# Patient Record
Sex: FEMALE | Race: WHITE | Employment: OTHER | URBAN - METROPOLITAN AREA
[De-identification: names, ages, dates, MRNs, and addresses within clinical notes are randomized per-mention and may not be internally consistent; named-entity substitution may affect disease eponyms.]

---

## 2019-06-19 ENCOUNTER — HOSPITAL ENCOUNTER (OUTPATIENT)
Age: 59
Discharge: HOME | End: 2019-06-21

## 2019-06-19 LAB
ABO/RH: NORMAL
ANTIBODY SCREEN: NORMAL

## 2019-06-19 PROCEDURE — 87081 CULTURE SCREEN ONLY: CPT

## 2019-06-19 PROCEDURE — 86900 BLOOD TYPING SEROLOGIC ABO: CPT

## 2019-06-19 PROCEDURE — 87088 URINE BACTERIA CULTURE: CPT

## 2019-06-19 PROCEDURE — 87077 CULTURE AEROBIC IDENTIFY: CPT

## 2019-06-19 PROCEDURE — 87186 SC STD MICRODIL/AGAR DIL: CPT

## 2019-06-19 PROCEDURE — 86850 RBC ANTIBODY SCREEN: CPT

## 2019-06-19 PROCEDURE — 86901 BLOOD TYPING SEROLOGIC RH(D): CPT

## 2019-06-21 LAB
MRSA CULTURE ONLY: NORMAL
ORGANISM: ABNORMAL
URINE CULTURE, ROUTINE: ABNORMAL
URINE CULTURE, ROUTINE: ABNORMAL

## 2019-06-28 ENCOUNTER — HOSPITAL ENCOUNTER (OUTPATIENT)
Age: 59
Discharge: HOME | End: 2019-06-30

## 2019-06-28 LAB
HCT VFR BLD CALC: 39.8 % (ref 34–48)
HEMOGLOBIN: 13.2 G/DL (ref 11.5–15.5)
MCH RBC QN AUTO: 30 PG (ref 26–35)
MCHC RBC AUTO-ENTMCNC: 33.2 % (ref 32–34.5)
MCV RBC AUTO: 90.5 FL (ref 80–99.9)
PDW BLD-RTO: 13.5 FL (ref 11.5–15)
PLATELET # BLD: 369 E9/L (ref 130–450)
PMV BLD AUTO: 9.9 FL (ref 7–12)
RBC # BLD: 4.4 E12/L (ref 3.5–5.5)
WBC # BLD: 8.6 E9/L (ref 4.5–11.5)

## 2019-06-28 PROCEDURE — 85027 COMPLETE CBC AUTOMATED: CPT

## 2019-06-29 ENCOUNTER — HOSPITAL ENCOUNTER (OUTPATIENT)
Age: 59
Discharge: HOME | End: 2019-07-01

## 2019-06-29 LAB
ANION GAP SERPL CALCULATED.3IONS-SCNC: 9 MMOL/L (ref 7–16)
BUN BLDV-MCNC: 9 MG/DL (ref 8–23)
CALCIUM SERPL-MCNC: 8.3 MG/DL (ref 8.6–10.2)
CHLORIDE BLD-SCNC: 107 MMOL/L (ref 98–107)
CO2: 24 MMOL/L (ref 22–29)
CREAT SERPL-MCNC: 0.7 MG/DL (ref 0.5–1)
GFR AFRICAN AMERICAN: >60
GFR NON-AFRICAN AMERICAN: >60 ML/MIN/1.73
GLUCOSE BLD-MCNC: 190 MG/DL (ref 74–99)
HCT VFR BLD CALC: 34.6 % (ref 34–48)
HEMOGLOBIN: 11 G/DL (ref 11.5–15.5)
MCH RBC QN AUTO: 30.3 PG (ref 26–35)
MCHC RBC AUTO-ENTMCNC: 31.8 % (ref 32–34.5)
MCV RBC AUTO: 95.3 FL (ref 80–99.9)
PDW BLD-RTO: 13.9 FL (ref 11.5–15)
PLATELET # BLD: 272 E9/L (ref 130–450)
PMV BLD AUTO: 10.5 FL (ref 7–12)
POTASSIUM SERPL-SCNC: 5.2 MMOL/L (ref 3.5–5)
RBC # BLD: 3.63 E12/L (ref 3.5–5.5)
SODIUM BLD-SCNC: 140 MMOL/L (ref 132–146)
WBC # BLD: 21.8 E9/L (ref 4.5–11.5)

## 2019-06-29 PROCEDURE — 80048 BASIC METABOLIC PNL TOTAL CA: CPT

## 2019-06-29 PROCEDURE — 85027 COMPLETE CBC AUTOMATED: CPT

## 2019-06-30 ENCOUNTER — HOSPITAL ENCOUNTER (OUTPATIENT)
Age: 59
Discharge: HOME | End: 2019-07-02

## 2019-06-30 LAB
ANION GAP SERPL CALCULATED.3IONS-SCNC: 9 MMOL/L (ref 7–16)
BUN BLDV-MCNC: 8 MG/DL (ref 8–23)
CALCIUM SERPL-MCNC: 8.6 MG/DL (ref 8.6–10.2)
CHLORIDE BLD-SCNC: 107 MMOL/L (ref 98–107)
CO2: 24 MMOL/L (ref 22–29)
CREAT SERPL-MCNC: 0.7 MG/DL (ref 0.5–1)
GFR AFRICAN AMERICAN: >60
GFR NON-AFRICAN AMERICAN: >60 ML/MIN/1.73
GLUCOSE BLD-MCNC: 133 MG/DL (ref 74–99)
HCT VFR BLD CALC: 30 % (ref 34–48)
HEMOGLOBIN: 9.4 G/DL (ref 11.5–15.5)
MCH RBC QN AUTO: 29.6 PG (ref 26–35)
MCHC RBC AUTO-ENTMCNC: 31.3 % (ref 32–34.5)
MCV RBC AUTO: 94.3 FL (ref 80–99.9)
PDW BLD-RTO: 14.2 FL (ref 11.5–15)
PLATELET # BLD: 246 E9/L (ref 130–450)
PMV BLD AUTO: 10.6 FL (ref 7–12)
POTASSIUM SERPL-SCNC: 4.5 MMOL/L (ref 3.5–5)
RBC # BLD: 3.18 E12/L (ref 3.5–5.5)
SODIUM BLD-SCNC: 140 MMOL/L (ref 132–146)
WBC # BLD: 28.8 E9/L (ref 4.5–11.5)

## 2019-06-30 PROCEDURE — 85027 COMPLETE CBC AUTOMATED: CPT

## 2019-06-30 PROCEDURE — 80048 BASIC METABOLIC PNL TOTAL CA: CPT

## 2019-07-01 ENCOUNTER — HOSPITAL ENCOUNTER (OUTPATIENT)
Age: 59
Discharge: HOME | End: 2019-07-03

## 2019-07-01 LAB
ANION GAP SERPL CALCULATED.3IONS-SCNC: 9 MMOL/L (ref 7–16)
BUN BLDV-MCNC: 13 MG/DL (ref 8–23)
CALCIUM SERPL-MCNC: 8.7 MG/DL (ref 8.6–10.2)
CHLORIDE BLD-SCNC: 105 MMOL/L (ref 98–107)
CO2: 26 MMOL/L (ref 22–29)
CREAT SERPL-MCNC: 0.7 MG/DL (ref 0.5–1)
GFR AFRICAN AMERICAN: >60
GFR NON-AFRICAN AMERICAN: >60 ML/MIN/1.73
GLUCOSE BLD-MCNC: 85 MG/DL (ref 74–99)
HCT VFR BLD CALC: 27 % (ref 34–48)
HEMOGLOBIN: 8.6 G/DL (ref 11.5–15.5)
MCH RBC QN AUTO: 30.2 PG (ref 26–35)
MCHC RBC AUTO-ENTMCNC: 31.9 % (ref 32–34.5)
MCV RBC AUTO: 94.7 FL (ref 80–99.9)
PDW BLD-RTO: 14.6 FL (ref 11.5–15)
PLATELET # BLD: 241 E9/L (ref 130–450)
PMV BLD AUTO: 10.8 FL (ref 7–12)
POTASSIUM SERPL-SCNC: 4.1 MMOL/L (ref 3.5–5)
RBC # BLD: 2.85 E12/L (ref 3.5–5.5)
SODIUM BLD-SCNC: 140 MMOL/L (ref 132–146)
WBC # BLD: 24.4 E9/L (ref 4.5–11.5)

## 2019-07-01 PROCEDURE — 85027 COMPLETE CBC AUTOMATED: CPT

## 2019-07-01 PROCEDURE — 80048 BASIC METABOLIC PNL TOTAL CA: CPT

## 2020-10-27 ENCOUNTER — HOSPITAL ENCOUNTER (OUTPATIENT)
Age: 60
Discharge: HOME | End: 2020-10-29

## 2020-10-27 PROCEDURE — 88307 TISSUE EXAM BY PATHOLOGIST: CPT

## 2020-10-27 PROCEDURE — 88311 DECALCIFY TISSUE: CPT

## 2020-10-27 PROCEDURE — 88342 IMHCHEM/IMCYTCHM 1ST ANTB: CPT

## 2020-10-27 PROCEDURE — 88341 IMHCHEM/IMCYTCHM EA ADD ANTB: CPT

## 2022-04-22 ENCOUNTER — TELEPHONE (OUTPATIENT)
Dept: SLEEP CENTER | Age: 62
End: 2022-04-22

## 2022-07-23 ENCOUNTER — HOSPITAL ENCOUNTER (OUTPATIENT)
Dept: SLEEP CENTER | Age: 62
Discharge: HOME | End: 2022-07-23
Payer: COMMERCIAL

## 2022-07-23 DIAGNOSIS — G47.33 OSA (OBSTRUCTIVE SLEEP APNEA): Primary | ICD-10-CM

## 2022-07-23 PROCEDURE — 95810 POLYSOM 6/> YRS 4/> PARAM: CPT

## 2022-07-28 ENCOUNTER — OFFICE VISIT (OUTPATIENT)
Dept: SLEEP CENTER | Age: 62
End: 2022-07-28
Payer: COMMERCIAL

## 2022-07-28 VITALS
DIASTOLIC BLOOD PRESSURE: 82 MMHG | HEIGHT: 62 IN | HEART RATE: 79 BPM | RESPIRATION RATE: 16 BRPM | BODY MASS INDEX: 35.77 KG/M2 | WEIGHT: 194.4 LBS | SYSTOLIC BLOOD PRESSURE: 125 MMHG | OXYGEN SATURATION: 97 %

## 2022-07-28 DIAGNOSIS — E66.9 OBESITY, UNSPECIFIED CLASSIFICATION, UNSPECIFIED OBESITY TYPE, UNSPECIFIED WHETHER SERIOUS COMORBIDITY PRESENT: Primary | ICD-10-CM

## 2022-07-28 DIAGNOSIS — G47.33 OSA (OBSTRUCTIVE SLEEP APNEA): ICD-10-CM

## 2022-07-28 PROCEDURE — 99203 OFFICE O/P NEW LOW 30 MIN: CPT | Performed by: STUDENT IN AN ORGANIZED HEALTH CARE EDUCATION/TRAINING PROGRAM

## 2022-07-28 RX ORDER — LEVOTHYROXINE SODIUM 0.07 MG/1
75 TABLET ORAL DAILY
COMMUNITY

## 2022-07-28 RX ORDER — LOSARTAN POTASSIUM 25 MG/1
25 TABLET ORAL DAILY
COMMUNITY

## 2022-07-28 RX ORDER — OMEPRAZOLE 20 MG/1
20 CAPSULE, DELAYED RELEASE ORAL DAILY
COMMUNITY

## 2022-07-28 RX ORDER — MECLIZINE HYDROCHLORIDE 25 MG/1
25 TABLET ORAL 3 TIMES DAILY PRN
COMMUNITY

## 2022-07-28 ASSESSMENT — SLEEP AND FATIGUE QUESTIONNAIRES
HOW LIKELY ARE YOU TO NOD OFF OR FALL ASLEEP WHILE WATCHING TV: 2
HOW LIKELY ARE YOU TO NOD OFF OR FALL ASLEEP WHILE LYING DOWN TO REST IN THE AFTERNOON WHEN CIRCUMSTANCES PERMIT: 3
HOW LIKELY ARE YOU TO NOD OFF OR FALL ASLEEP WHILE SITTING AND TALKING TO SOMEONE: 0
HOW LIKELY ARE YOU TO NOD OFF OR FALL ASLEEP IN A CAR, WHILE STOPPED FOR A FEW MINUTES IN TRAFFIC: 0
HOW LIKELY ARE YOU TO NOD OFF OR FALL ASLEEP WHILE SITTING QUIETLY AFTER LUNCH WITHOUT ALCOHOL: 0
HOW LIKELY ARE YOU TO NOD OFF OR FALL ASLEEP WHEN YOU ARE A PASSENGER IN A CAR FOR AN HOUR WITHOUT A BREAK: 0
HOW LIKELY ARE YOU TO NOD OFF OR FALL ASLEEP WHILE SITTING AND READING: 0
ESS TOTAL SCORE: 5
HOW LIKELY ARE YOU TO NOD OFF OR FALL ASLEEP WHILE SITTING INACTIVE IN A PUBLIC PLACE: 0

## 2022-07-28 NOTE — PROGRESS NOTES
[facility] Sleep Medicine    Patient Name: Jeanne Mercado  Age: 60 y.o.   : 1960    Date of Visit: 8/3/22      HPI   Jeanne Mercado is a 60 y.o. female with  has a past medical history of Other specified hypothyroidism, Secondary hypertension, and Vertigo. who presents as a new patient to Sleep Clinic, referred by Dr. Ean Dinh, for Sleep Apnea     Patient presents to today to establish with sleep medicine after here recent sleep study. Her sleep study demonstrated mild SIMBA, however there were increased periods during REM and supine sleep. Patient was referred for a sleep study due to symptoms of snoring and difficulty staying asleep at night. Sleep Study Results  Diagnostic Sleep Study:     Study date: 2022     AHI: 5.9      Oxygen Toni: 84.0%    Bed time:   11p-2a  Wake time:   5-6 a  Sleep Latency (min): 15-20     Sleep Medications: None  Awakenings:   2-3   / bathroom and spontaneous / falls back asleep quickly  Estimated Sleep time (hours):  4-5  Daytime Naps: small naps while watching tv  Sleep disturbances: Back pain  Sleep Location: Bed  Sleep environment: Sleeps alone  Occupation: retired    SLEEP HYGIENE     Activities before bed: TV  Caffeine:  0   Coffee    occasional soda   Soda    0   Tea  Alcohol: occasionally  Tobacco: none        Sleep ROS:     Snoring: Y   Witnessed apneas: N  AM Headache: N  Dry Mouth: N  Daytime Sleepiness: Y  Difficulty remembering things: N  MVA  or near miss accident due to sleepiness in the past? N  Tonsillectomy? N  Have you lost or gained weight recently?  Gained- 5 lbs       PARASOMNIAS/ NARCOLEPSY:  Hypnogogic/Hynopompic Hallucinations: N   Sleep paralysis: N  Cataplexy: N   REM behavior symptoms: N  Nightmare: N   Sleep Walking: N  Sleep Talking: N  RLS Symptoms: occasionally restless feeling, improved with motion,  dosnt interfer with sleep           PMH:  Past Medical History:   Diagnosis Date    Other specified hypothyroidism     Secondary hypertension Vertigo         PSH:  Past Surgical History:   Procedure Laterality Date    ANKLE SURGERY      BACK SURGERY      CHOLECYSTECTOMY            Soc Hx:  Social History     Tobacco Use    Smoking status: Never    Smokeless tobacco: Never        Fam Hx:  Family History   Problem Relation Age of Onset    Heart Disease Sister         Current Outpatient Medications   Medication Sig Dispense Refill    levothyroxine (SYNTHROID) 75 MCG tablet Take 75 mcg by mouth in the morning. losartan (COZAAR) 25 MG tablet Take 25 mg by mouth in the morning. omeprazole (PRILOSEC) 20 MG delayed release capsule Take 20 mg by mouth in the morning. vitamin D 25 MCG (1000 UT) CAPS Take 1 capsule by mouth in the morning. meclizine (ANTIVERT) 25 MG tablet Take 25 mg by mouth 3 times daily as needed for Dizziness       No current facility-administered medications for this visit. Review of Systems  Constitutional: no chills, no fever and no night sweats. Eyes: no blurred vision and no eyesight problems. ENT: no hearing loss, no nasal congestion, no nasal discharge, no hoarseness and no sore throat. Cardiovascular: no chest pain, no lower extremity edema. Respiratory: no cough, no shortness of breath at rest and no wheezing. Gastrointestinal: no abdominal pain, no nausea and no vomiting. Musculoskeletal: no arthralgias, no back pain and no myalgias. Integumentary: no rashes. Neurological: no difficulty walking, no diplopia, no dizziness, no dysarthria, no facial weakness, no headache, no limb weakness, no memory changes, no numbness, no speech difficulties and no tingling. Endocrine: no changes in appetite, no feelings of weakness and no recent abnormal weight gain/loss.    Hematologic/Lymphatic: no tendency for easy bruising or bleeding      Objective:   Physical Exam  /82 (Site: Left Upper Arm, Position: Sitting, Cuff Size: Large Adult)   Pulse 79   Resp 16   Ht 5' 2\" (1.575 m)   Wt 194 lb 6.4 oz (88.2 kg)   SpO2 97%   BMI 35.56 kg/m²             General: No acute distress. BMI Body mass index is 35.56 kg/m². HEENT: Normocephalic, atraumatic. No oropharyngeal lesions. There were no vitals filed for this visit. Mallampati class- 3  Tonsils- 1     Neck: Trachea midline  Lungs: Clear to auscultation bilaterally. No wheezes or crackles  Cardiac: Regular rate and rhythm. No murmurs appreciated. Neurologic: Normal gait. Balance intact. Psychiatric: Alert and oriented. Appropriate affect. Extremities: No clubbing or no peripheral edema  Skin: No lesions or rashes  Hematologic/lymphatic/immunologic: No bruises or bleeding    Sleep Medicine 7/28/2022   Sitting and reading 0   Watching TV 2   Sitting, inactive in a public place (e.g. a theatre or a meeting) 0   As a passenger in a car for an hour without a break 0   Lying down to rest in the afternoon when circumstances permit 3   Sitting and talking to someone 0   Sitting quietly after a lunch without alcohol 0   In a car, while stopped for a few minutes in traffic 0   Total score 5       PROCEDURE HISTORY      Diagnostic Sleep Study:     Study date: 7/23/2022     AHI: 5.9      Oxygen Toni: 84.0%    PERTINENT LAB RESULTS  No results found for: FERRITIN       Assessment:      Abena Taylor was seen today for sleep apnea. Diagnoses and all orders for this visit:    Obesity, unspecified classification, unspecified obesity type, unspecified whether serious comorbidity present  -     Ambulatory Referral to Bariatric Surgery    SIMBA (obstructive sleep apnea)     Plan:      SIMBA. Sleep study results reviewed with the patient. Treatment options were discussed with the patient, including weight loss, CPAP titration, APAP trial, oral appliance evaluation, and possible surgical options. After discussion, patient elected to proceed with weight loss and follow up in 6 months due to mild severity.  Patient was advised about the risks of untreated SIMBA and these were included in the patient's handout. 2.  Obesity. Body mass index is 35.56 kg/m². - Referral to medical weight loss.  -Healthy weight loss advised      Patient will follow up Return in about 6 months (around 1/28/2023) for Follow up SIMBA.     Saleem Gallego, [address] [place] [address] Rd -[phone] option 2  F- [phone]

## 2022-07-28 NOTE — PATIENT INSTRUCTIONS
Notice of Provider Departure    Dr. Lilo Zuleta will be leaving [facility] ([facility]) in September. Our department has 3 providers that you can follow up with for your sleep care after September 16th:       [facility] CNP  [address] [facility] TASNEEM Eng MD  [address] [place] [address] free to call our office with any questions    Ashelyloretoemilymylaminnie [phone] option 2  f- [phone]      Please try avoiding sleeping on your back (there are special pillows for this, supine avoidance pillows) and elevated the head of your bed. Please note that complications of SIMBA if not treated can increase risk for: systemic hypertension , pulmonary hypertension (high blood pressure in the lungs), cardiovascular morbidities (heart attack and stroke), car accidents and all cause mortality (increased risk of death). It is recommended that you be treated for sleep apnea. Please discuss your sleep study results with you primary care provider, Jami Formna MD, and let me know if you would like to proceed with treatment for sleep apnea. The general treatment options for sleep apnea are listed below. The general categories for the treatment of Sleep Apnea (including benefits and potential side effects) include:     1. Supportive Care  -Elevate the head of the bed   -Avoid sleeping on your back      2. Weight loss  -Start a healthy weight loss program  -Consider a referral to Weight Loss Medicine Clinic  -Benefits: Multiple health benefits  -Side Effects: Depends on treatment type     3. Oral Appliance \"Mouth Piece\"  (Mandibular Advancement Device)  -Benefits: CPAP alternative. Decreased snoring, improved sleep quality, decreased sleepiness.  -Side Effects: teeth crowding, oral/dental pain. Regular visits with dentist are advised to watch for side effects.      4. Positive pressure therapy with a machine and a mask (CPAP or BiPAP)  Benefits: Decreased snoring, improved sleep quality, decreased sleepiness. Risks: Aerophagia (swallowing air), dry nose/nose bleeds, dry eyes (due to leak), skin sores/rash (due to mask)     5. Different kinds of surgeries, including nasal surgery, surgery of the throat/windpipe, and nerve stimulation therapy (INSPIRE). Benefits: CPAP alternative. Decreased snoring, improved sleep quality, decreased sleepiness. Risks: Bleeding, infection, nerve damage          For general questions or scheduling issues, call the sleep nurses at [phone] option [id number] [place], [place] [phone] option 2  f- [phone]           Learning About Sleep Apnea  What is it? Sleep apnea means that breathing stops for short periods during sleep. When you stop breathing or have reduced airflow into your lungs during sleep, you don't sleep well and you can be very tired during the day. The oxygen levels in your blood may go down, and carbon dioxide levels go up. It may lead to other problems, such as high blood pressure and heart disease. Sleep apnea can range from mild to severe, based on how often breathing stops during sleep. Breathing may stop as few as 5 times an hour (mild apnea) to 30 or more times an hour (severe apnea). Obstructive sleep apnea is the most common type. This most often occurs because your airways are blocked or partly blocked. Central sleep apnea is less common. It happens when the brain has trouble controlling breathing. Some people have both types. That's called complex sleep apnea. What are the symptoms? There are symptoms of sleep apnea that you may notice and symptoms that others may notice when you're asleep. Symptoms you may notice include:  Feeling extremely sleepy during the day. Feeling unrefreshed or tired after a night's sleep. Problems with memory and concentration, or mood changes. Morning or night headaches. Heartburn or a sour taste in your mouth at night.   Swelling of the legs. Getting up often during the night to urinate. A dry mouth or sore throat in the morning. Your bed partner may notice that you:  Have episodes of not breathing. Snore loudly. Almost all people who have sleep apnea snore. But not all people who snore have sleep apnea. Toss and turn during sleep. Have nighttime choking or gasping spells. How is it diagnosed? Your doctor will probably do a physical exam and ask about your past health. He or she may also ask you or your bed partner about your snoring and sleep behavior and how tired you feel during the day. Your doctor may suggest a sleep study. Sleep studies are a series of tests that look at what happens to the body during sleep. They check for how often you stop breathing or have too little air flowing into your lungs during sleep. They also find out how much oxygen you have in your blood during sleep. A sleep study may take place in your home. Or it might take place at a sleep center, where you will spend the night. How is it treated? Sleep apnea is often treated with devices that deliver air through a mask to help keep your airways open. These include:  Continuous positive airway pressure (CPAP). This increases air pressure in your throat. It keeps your airway open when you breathe in. It's the most common device. Bilevel positive airway pressure (BiPAP). This uses different air pressures when you breathe in and out. Adaptive servo ventilation (ASV). It senses pauses in breathing and adjusts air pressure. It's mostly used for central sleep apnea. If your tonsils or other tissues are blocking your airway, your doctor may suggest surgery to open the airway. How can you care for yourself? You may be able to treat mild sleep apnea by making changes in how you live and the way you sleep. For example, it may help to: Lose weight in a healthy manner if you are overweight. Sleep on your side, not your back.   Avoid alcohol and medicines such as sedatives before bed. Where can you learn more? Go to https://[email or web address].[email or web address]. org and sign in to your [employer] account. Enter S121 in the ivWatch box to learn more about \"Learning About Sleep Apnea. \"     If you do not have an account, please click on the \"Sign Up Now\" link. Current as of: February 24, 2020               Content Version: 12.6  © 2006-2020 [employer]. Care instructions adapted under license by [facility] ([facility]). If you have questions about a medical condition or this instruction, always ask your healthcare professional. [address] any warranty or liability for your use of this information.

## 2022-07-29 ENCOUNTER — TELEPHONE (OUTPATIENT)
Dept: BARIATRICS/WEIGHT MGMT | Age: 62
End: 2022-07-29

## 2022-08-03 PROBLEM — G47.33 OSA (OBSTRUCTIVE SLEEP APNEA): Status: ACTIVE | Noted: 2022-08-03

## 2022-08-03 LAB — STATUS: NORMAL

## 2022-08-03 PROCEDURE — 95810 POLYSOM 6/> YRS 4/> PARAM: CPT | Performed by: STUDENT IN AN ORGANIZED HEALTH CARE EDUCATION/TRAINING PROGRAM

## 2023-01-19 ENCOUNTER — OFFICE VISIT (OUTPATIENT)
Dept: SLEEP CENTER | Age: 63
End: 2023-01-19
Payer: COMMERCIAL

## 2023-01-19 VITALS
RESPIRATION RATE: 16 BRPM | HEART RATE: 96 BPM | WEIGHT: 193.6 LBS | BODY MASS INDEX: 35.63 KG/M2 | OXYGEN SATURATION: 95 % | HEIGHT: 62 IN | SYSTOLIC BLOOD PRESSURE: 127 MMHG | DIASTOLIC BLOOD PRESSURE: 85 MMHG

## 2023-01-19 DIAGNOSIS — G47.33 OBSTRUCTIVE SLEEP APNEA: Primary | ICD-10-CM

## 2023-01-19 DIAGNOSIS — E66.9 OBESITY, UNSPECIFIED CLASSIFICATION, UNSPECIFIED OBESITY TYPE, UNSPECIFIED WHETHER SERIOUS COMORBIDITY PRESENT: ICD-10-CM

## 2023-01-19 PROCEDURE — 99213 OFFICE O/P EST LOW 20 MIN: CPT | Performed by: NURSE PRACTITIONER

## 2023-01-19 RX ORDER — LIOTHYRONINE SODIUM 5 UG/1
5 TABLET ORAL DAILY
COMMUNITY

## 2023-01-19 ASSESSMENT — SLEEP AND FATIGUE QUESTIONNAIRES
HOW LIKELY ARE YOU TO NOD OFF OR FALL ASLEEP WHILE SITTING AND READING: 2
HOW LIKELY ARE YOU TO NOD OFF OR FALL ASLEEP WHILE SITTING QUIETLY AFTER LUNCH WITHOUT ALCOHOL: 1
ESS TOTAL SCORE: 5
HOW LIKELY ARE YOU TO NOD OFF OR FALL ASLEEP WHILE LYING DOWN TO REST IN THE AFTERNOON WHEN CIRCUMSTANCES PERMIT: 0
HOW LIKELY ARE YOU TO NOD OFF OR FALL ASLEEP WHILE WATCHING TV: 2
HOW LIKELY ARE YOU TO NOD OFF OR FALL ASLEEP WHEN YOU ARE A PASSENGER IN A CAR FOR AN HOUR WITHOUT A BREAK: 0
HOW LIKELY ARE YOU TO NOD OFF OR FALL ASLEEP IN A CAR, WHILE STOPPED FOR A FEW MINUTES IN TRAFFIC: 0
HOW LIKELY ARE YOU TO NOD OFF OR FALL ASLEEP WHILE SITTING AND TALKING TO SOMEONE: 0
HOW LIKELY ARE YOU TO NOD OFF OR FALL ASLEEP WHILE SITTING INACTIVE IN A PUBLIC PLACE: 0

## 2023-01-19 NOTE — PROGRESS NOTES
[facility] Sleep Medicine    Patient Name: Linette Edgar  Age: 58 y.o.   : 1960    Date of Visit: 23        Review of Last Visit Summary:    The patient was last seen on 2022 by Dr. Tristan Quintanilla for  Obstructive Sleep Apnea. Interim History:     Linette Edgar is a 58 y.o. female that  has a past medical history of Other specified hypothyroidism, Secondary hypertension, and Vertigo. She presents in follow up to Sleep Clinic for mild SIMBA. Interval Events:    -No significant changes in sleep symptoms or disturbances since last visit with Dr. Cary Valles. -Mild snoring is present at night.  -Waking up feeling rested, good energy throughout the day. -Naturally goes to bed late and wakes up at times through the night. Sleeping on average 5 hours but has been on this pattern for as long as she can remember.   -Upcoming appointment with Dr. Aden Chang to aid in weight loss journey.  -No further complaints. Sleep Study History: Diagnostic Sleep Study:     2022- @ [facility]- wt 186 lbs, sleep efficiency 72%, REM sleep 19%,     AHI: 5.9 , supine AHI 29, REM AHI 22,     oxygen Toni: 84.0%, T<88% was 3.9 minutes of total sleep time    Sleep History:     Bed time:   11p-2a  Wake time:   5-6 a  Sleep Latency (min): 15-20     Sleep Medications: None  Awakenings:   2-3   / bathroom and spontaneous / falls back asleep quickly  Estimated Sleep time (hours):  4-5  Daytime Naps: small naps while watching tv  Sleep disturbances: Back pain  Sleep Location: Bed  Sleep environment: Sleeps alone  Occupation: retired     SLEEP HYGIENE     Activities before bed: TV  Caffeine:  0   Coffee    occasional soda   Soda    0   Tea  Alcohol: occasionally  Tobacco: none        Sleep ROS:     Snoring: Y   Witnessed apneas: N  AM Headache: N  Dry Mouth: N  Daytime Sleepiness: Y  Difficulty remembering things: N  MVA  or near miss accident due to sleepiness in the past? N  Tonsillectomy?  N  Have you lost or gained weight recently? Gained- 5 lbs        PARASOMNIAS/ NARCOLEPSY:  Hypnogogic/Hynopompic Hallucinations: N   Sleep paralysis: N  Cataplexy: N   REM behavior symptoms: N  Nightmare: N   Sleep Walking: N  Sleep Talking: N  RLS Symptoms: occasionally restless feeling, improved with motion,  dosnt interfer with sleep  Past Medical History:  Past Medical History:   Diagnosis Date    Other specified hypothyroidism     Secondary hypertension     Vertigo        Past Surgical History:        Procedure Laterality Date    ANKLE SURGERY      BACK SURGERY      CHOLECYSTECTOMY         Allergies:  is allergic to naproxen.   Social History:    Social History     Tobacco Use    Smoking status: Never    Smokeless tobacco: Never        Family History:       Problem Relation Age of Onset    Heart Disease Sister        Current Medications:    Current Outpatient Medications:     liothyronine (CYTOMEL) 5 MCG tablet, Take 5 mcg by mouth daily, Disp: , Rfl:     levothyroxine (SYNTHROID) 75 MCG tablet, Take 75 mcg by mouth in the morning., Disp: , Rfl:     losartan (COZAAR) 25 MG tablet, Take 25 mg by mouth in the morning., Disp: , Rfl:     omeprazole (PRILOSEC) 20 MG delayed release capsule, Take 20 mg by mouth in the morning., Disp: , Rfl:     vitamin D 25 MCG (1000 UT) CAPS, Take 1 capsule by mouth in the morning., Disp: , Rfl:     meclizine (ANTIVERT) 25 MG tablet, Take 25 mg by mouth 3 times daily as needed for Dizziness, Disp: , Rfl:     Sleep Medicine 1/19/2023 7/28/2022   Sitting and reading 2 0   Watching TV 2 2   Sitting, inactive in a public place (e.g. a theatre or a meeting) 0 0   As a passenger in a car for an hour without a break 0 0   Lying down to rest in the afternoon when circumstances permit 0 3   Sitting and talking to someone 0 0   Sitting quietly after a lunch without alcohol 1 0   In a car, while stopped for a few minutes in traffic 0 0   [place] Sleepiness Score 5 5     Review of Systems:    Constitutional: no chills, no fever Eyes: no blurred vision   Cardiovascular: no chest pain,   Respiratory: no cough, no shortness of breath   Gastrointestinal:  no nausea,  no vomiting, no diarrhea. Musculoskeletal: no arthralgias, no back pain   Neurological:  no dizziness,  no headache, no memory changes. Endocrine: No chills    Objective:   PHYSICAL EXAM:    /85 (Site: Left Upper Arm, Position: Sitting, Cuff Size: Large Adult)   Pulse 96   Resp 16   Ht 5' 2\" (1.575 m)   Wt 193 lb 9.6 oz (87.8 kg)   SpO2 95%   BMI 35.41 kg/m²     Physical exam:  Gen: No acute distress. BMI of Body mass index is 35.41 kg/m². Neck: Trachea midline. No obvious mass. Neck circumference     Resp: No accessory muscle use. No crackles. No wheezes. No rhonchi. CV: Regular rate. Regular rhythm. No murmur or rub. Skin: Warm and dry. M/S: No cyanosis. No obvious joint deformity. Neuro: Awake. Alert. Moves all four extremities. Psych: Alert and oriented. No anxiety. Assessment:      Harshil Christianson was seen today for sleep problem. Diagnoses and all orders for this visit:    Obstructive sleep apnea    Obesity, unspecified classification, unspecified obesity type, unspecified whether serious comorbidity present     Plan:      Mild Obstructive Sleep Apnea    -Reviewed sleep study with patient. Mild SIMBA based on symptoms and co morbid conditions. Discussed treatment options, at this time patient not interested in PAP therapy.   -Discussed avoidance of supine sleep, as SIMBA worsened to a moderate degree when supine in sleep study.  -Reviewed CPAP options, not interested at this time.  -Encouragement given to pursue mild weight loss. -If symptoms worsen in severity, please contact office. 2. Obesity. Body mass index is 35.41 kg/m². -Upcoming apt with Dr. Bill Chacko.  -Discussed impact of weight gain on SIMBA severity. Patient understands that SIMBA severity may improve with weight loss but no guarantee of cure can be made.       Return if symptoms worsen or fail to improve.     Deyvi Aguilera, APRN-CNP  [address] [facility]  P -[phone] option 2  H- [phone]

## 2023-03-30 ENCOUNTER — OFFICE VISIT (OUTPATIENT)
Dept: BARIATRICS/WEIGHT MGMT | Age: 63
End: 2023-03-30
Payer: COMMERCIAL

## 2023-03-30 VITALS
HEIGHT: 61 IN | TEMPERATURE: 97.9 F | BODY MASS INDEX: 36.17 KG/M2 | WEIGHT: 191.6 LBS | SYSTOLIC BLOOD PRESSURE: 144 MMHG | DIASTOLIC BLOOD PRESSURE: 81 MMHG | HEART RATE: 77 BPM

## 2023-03-30 DIAGNOSIS — G47.33 OSA (OBSTRUCTIVE SLEEP APNEA): Primary | ICD-10-CM

## 2023-03-30 DIAGNOSIS — E66.09 CLASS 2 OBESITY DUE TO EXCESS CALORIES WITHOUT SERIOUS COMORBIDITY WITH BODY MASS INDEX (BMI) OF 36.0 TO 36.9 IN ADULT: ICD-10-CM

## 2023-03-30 PROBLEM — E66.9 OBESITY: Status: ACTIVE | Noted: 2023-03-30

## 2023-03-30 PROCEDURE — 99205 OFFICE O/P NEW HI 60 MIN: CPT | Performed by: INTERNAL MEDICINE

## 2023-03-30 RX ORDER — LEVOTHYROXINE SODIUM 0.05 MG/1
50 TABLET ORAL DAILY
COMMUNITY

## 2023-03-30 NOTE — PROGRESS NOTES
the morning. vitamin D 25 MCG (1000 UT) CAPS Take 1 capsule by mouth in the morning. meclizine (ANTIVERT) 25 MG tablet Take 25 mg by mouth 3 times daily as needed for Dizziness       No current facility-administered medications for this visit. ROS -  Card - no CP  GI - no N/V/D/C    PE -  Gen : BP (!) 144/81 (Site: Left Upper Arm, Position: Sitting, Cuff Size: Large Adult)   Pulse 77   Temp 97.9 °F (36.6 °C) (Temporal)   Ht 5' 0.5\" (1.537 m)   Wt 191 lb 9.6 oz (86.9 kg)   BMI 36.80 kg/m²    WN, WD, NAD  Heart:  RRR w/o MGR, no Carotid bruits  Lung: Nml resp effort, CTA b/l  Psych: Normal mood   Full affect  Neuro: Moves all ext well  ______________________    HISTORY & ASSESSMENT/PLAN -     Prob 1 - SIMBA   HPI -  7/2023 Sleep study AHI 5.9   Opted to not start PAP therapy   Follows with Ms. Cox DUC Johnson   Daytime drowsiness this past week: 6/10  Assessment - Uncontrolled; excess wt is increasing the degree of the underlying airway obstruction and decreasing oxygenation   Plan -   Wt reduction per the plan below    Prob 2 - Obesity   HPI - See above Background for description           Weight  Date           191.6 lbs 03/30/23  DEN = 1575 lizzette/d  Wt effect of HR foods = [employer] 300 lizzette/wk + Sweets 2050 +  = 3268 lizzette/wk = 465 lizzette/d = 30% DEN = 46 lbs/yr  Counting calories may not be necessary. The calorie space occupied by her HR foods is high enough that reduction of it by elimination/restriction of food type may alone be sufficient to produce an adequate rate of wt loss. Will, however, provide a daily calorie target to provide a general guide for making calorie conscious choices. Will hold on an appetite suppressant at this time since she believes that her appetite will not be much of a problem. Will reconsider at the next visit.   Assessment - Uncontrolled   Plan -   Rules:  Limit sweets to one day per month  Eliminate all sugar sweetened beverages (including fruit

## 2023-03-30 NOTE — PATIENT INSTRUCTIONS
Rules:  Limit sweets to one day per month  Eliminate all sugar sweetened beverages (including fruit juice)    Requirements:  Make sure protein intake is at least 75 grams per day (do not count protein every day; instead spot check your intake every 2-3 weeks and make sure what you think you are getting is close to accurate; consider using a protein shake if needed; these are in the pharmacy section of the stores, not the grocery section; Premier, Pure Protein and Fairlife are relatively inexpensive and taste good to most patients; other options are Nectar, Boost Max, Ensure Max, BeneProtein and GNC lean (which is lactose-free); Nectar fruit, Premier Protein Clear, IsoPure Protein Drink, and Protein 2 O are water-based options; Quest (or Cosco, which is cheaper and is ordered on [address] [facility] [address]) and the Revl 1 protein bars can also be used, but have less protein in them )  (Disclaimer: Dietary supplements rarely have their listed ingredients and the amount of each verified by a third party other. Sometimes they give verification for their claims to be GMO and gluten free and to be organic. However, even such verifications as these may still be untrustworthy.)  Make sure that fiber intake is at least 22 grams per day. Do this by either eating 12 tablespoons of the original, plain Fiber One cereal every day or 4 tablespoons of wheat dextrin powder (Benefiber or a generic brand) every day. Work up to this amount slowly by starting with only one-eighth to one-fourth of the target amount and then adding another one-eighth to one-fourth every one or two weeks until reaching the target. Also, Nature Made fiber gummies with inulin is an option. Drink at least 64 oz of water each day  Take one multivitamin every day    Targets:  Limit calorie intake to 1200 calories/day  Walk 30 minutes daily  Avoid eating 2 hours within bedtime.      Tips:  Do not eat outside of the dining room or the kitchen  Do not eat while watching TV,

## 2023-05-11 ENCOUNTER — OFFICE VISIT (OUTPATIENT)
Dept: BARIATRICS/WEIGHT MGMT | Age: 63
End: 2023-05-11
Payer: COMMERCIAL

## 2023-05-11 VITALS
SYSTOLIC BLOOD PRESSURE: 143 MMHG | HEIGHT: 61 IN | WEIGHT: 190 LBS | DIASTOLIC BLOOD PRESSURE: 75 MMHG | TEMPERATURE: 97.3 F | BODY MASS INDEX: 35.87 KG/M2 | HEART RATE: 56 BPM

## 2023-05-11 DIAGNOSIS — G47.33 OSA (OBSTRUCTIVE SLEEP APNEA): Primary | ICD-10-CM

## 2023-05-11 DIAGNOSIS — E66.09 CLASS 2 OBESITY DUE TO EXCESS CALORIES WITHOUT SERIOUS COMORBIDITY WITH BODY MASS INDEX (BMI) OF 36.0 TO 36.9 IN ADULT: ICD-10-CM

## 2023-05-11 PROCEDURE — 99211 OFF/OP EST MAY X REQ PHY/QHP: CPT

## 2023-05-11 PROCEDURE — 99213 OFFICE O/P EST LOW 20 MIN: CPT | Performed by: INTERNAL MEDICINE

## 2023-05-11 NOTE — PROGRESS NOTES
next visit. Update:  Calorie conscious:  Not counting (not part of her plan); + calorie conscious  Sweets: none  SSB: none  Exercising: walking 20-30 min 3d/wk   Protein: has not checked her intake; 1 Ensure High Protein  Fiber: Benefiber 1 Tbsp daily  Water: >48 oz/day  Will be going to [facility] and [facility] for a three weeks; leaves June 5th; states she will not be drinking much Etoh  Assessment - Improved; cont with present plan per her preference; she does not want to add calorie counting or start an appetite suppressant; needs to increase water intake  Plan -   Rules:  Limit sweets to one day per month  Eliminate all sugar sweetened beverages (including fruit juice)    Requirements:  Make sure protein intake is at least 75 grams per day (do not count protein every day; instead spot check your intake every 2-3 weeks and make sure what you think you are getting is close to accurate; consider using a protein shake if needed; these are in the pharmacy section of the stores, not the grocery section; Premier, Pure Protein and Fairlife are relatively inexpensive and taste good to most patients; other options are Nectar, Boost Max, Ensure Max, BeneProtein and GNC lean (which is lactose-free); Nectar fruit, Premier Protein Clear, IsoPure Protein Drink, and Protein 2 O are water-based options; Quest (or Cosco, which is cheaper and is ordered on [address]) and the Oh Yeah 1 protein bars can also be used, but have less protein in them )  (Disclaimer: Dietary supplements rarely have their listed ingredients and the amount of each verified by a third party other. Sometimes they give verification for their claims to be GMO and gluten free and to be organic. However, even such verifications as these may still be untrustworthy.)  Make sure that fiber intake is at least 22 grams per day.  Do this by either eating 12 tablespoons of the original, plain Fiber One cereal every day or 4 tablespoons of wheat dextrin powder (Benefiber or

## 2023-05-11 NOTE — PATIENT INSTRUCTIONS
Rules:  Limit sweets to one day per month  Eliminate all sugar sweetened beverages (including fruit juice)    Requirements:  Make sure protein intake is at least 75 grams per day (do not count protein every day; instead spot check your intake every 2-3 weeks and make sure what you think you are getting is close to accurate; consider using a protein shake if needed; these are in the pharmacy section of the stores, not the grocery section; Premier, Pure Protein and Fairlife are relatively inexpensive and taste good to most patients; other options are Nectar, Boost Max, Ensure Max, BeneProtein and GNC lean (which is lactose-free); Nectar fruit, Premier Protein Clear, IsoPure Protein Drink, and Protein 2 O are water-based options; Quest (or Cosco, which is cheaper and is ordered on [address]) and the Numerex 1 protein bars can also be used, but have less protein in them )  (Disclaimer: Dietary supplements rarely have their listed ingredients and the amount of each verified by a third party other. Sometimes they give verification for their claims to be GMO and gluten free and to be organic. However, even such verifications as these may still be untrustworthy.)  Make sure that fiber intake is at least 22 grams per day. Do this by either eating 12 tablespoons of the original, plain Fiber One cereal every day or 4 tablespoons of wheat dextrin powder (Benefiber or a generic brand) every day. Work up to this amount slowly by starting with only one-eighth to one-fourth of the target amount and then adding another one-eighth to one-fourth every one or two weeks until reaching the target. Also, Nature Made fiber gummies with inulin is an option. Drink at least 64 oz of water each day  Take one multivitamin every day    Targets:  Limit calorie intake to 1200 calories/day  Walk 30 minutes daily  Avoid eating 2 hours within bedtime.      Tips:  Do not eat outside of the dining room or the kitchen  Do not eat while watching TV,

## 2023-07-11 ENCOUNTER — OFFICE VISIT (OUTPATIENT)
Dept: BARIATRICS/WEIGHT MGMT | Age: 63
End: 2023-07-11
Payer: COMMERCIAL

## 2023-07-11 VITALS
TEMPERATURE: 96.9 F | HEIGHT: 61 IN | SYSTOLIC BLOOD PRESSURE: 148 MMHG | WEIGHT: 185.6 LBS | BODY MASS INDEX: 35.04 KG/M2 | DIASTOLIC BLOOD PRESSURE: 73 MMHG | HEART RATE: 76 BPM

## 2023-07-11 DIAGNOSIS — G47.33 OSA (OBSTRUCTIVE SLEEP APNEA): Primary | ICD-10-CM

## 2023-07-11 DIAGNOSIS — E66.09 CLASS 2 OBESITY DUE TO EXCESS CALORIES WITHOUT SERIOUS COMORBIDITY WITH BODY MASS INDEX (BMI) OF 36.0 TO 36.9 IN ADULT: ICD-10-CM

## 2023-07-11 PROCEDURE — 99213 OFFICE O/P EST LOW 20 MIN: CPT | Performed by: INTERNAL MEDICINE

## 2023-07-11 PROCEDURE — 99211 OFF/OP EST MAY X REQ PHY/QHP: CPT

## 2023-07-11 NOTE — PROGRESS NOTES
CC -   SIMBA, Obesity    BACKGROUND -   Last visit: 05/11/23  First visit: 03/30/23    Obesity   Began in late 30's  Initial BMI 36.8, Wt 191.6, Ht 5' 0.5\"  HS Grad wt 150 lbs   Lowest   wt 150 lbs   Highest  wt 191 lbs  Pattern of wt gain: grad  Wt change past yr: + 8 lbs  Most wt lost: 10 lbs (eat less/right + exercise)  Other diets attempted: SF, an amphetamine    Desire to lose weight: 8-9/10  Problem posed by appetite: 4-5/10    Initial Diet:    Number of meals per day - 1-2    Number of snacks per day - 3    Meal volume - 9-12\" plate, sometimes seconds    Fast food/convenience store - 1-2x/week    Restaurants (not fast food) - 0-1x/week   Sweets - 7d/week (reg size snickers)   Chips - 0-1d/week   Crackers/pretzels - 1d/week   Nuts - 1-2d/week (one handful)   Peanut Butter - 1-2d/week (PBJ as a meal)   Popcorn - 0d/week   Dried fruit - 2-3d/week ([address] box)   Whole fruit - 0d/week   Breakfast cereal - 5-6d/week (2-3 d/wk 1 cereal bowl of pre-sweetened cereal and 3d/wk one [employer] Oat instant flavor pack; both are as meals)   Granola/Protein/Energy bar - 0d/week   Sugar sweetened beverages - 10-12 oz reg soda 2-3d/wk, 6 oz OJ/day   Protein - No supplements   Fiber - No supplements     Exercise:    Gym membership - none    Walking - none    Running - none    Resistance - none    Aerobic class - none    Elliptical - 10 min 3-4d/wk (began two weeks prior to this visit)    ______________________    [facility] -  Past Medical History:   Diagnosis Date    GERD (gastroesophageal reflux disease)     Obesity     SIMBA (obstructive sleep apnea)     Other specified hypothyroidism     Secondary hypertension     Vertigo      Current Outpatient Medications   Medication Sig Dispense Refill    levothyroxine (SYNTHROID) 50 MCG tablet Take 50 mcg by mouth Daily      Omeprazole 20 MG TBDD Take 1 capsule by mouth 2 times daily      liothyronine (CYTOMEL) 5 MCG tablet Take 5 mcg by mouth daily      losartan (COZAAR) 25 MG tablet

## 2023-07-11 NOTE — PATIENT INSTRUCTIONS
Rules:  Limit sweets to one day per month  Eliminate all sugar sweetened beverages (including fruit juice)    Requirements:  Make sure protein intake is at least 75 grams per day (do not count protein every day; instead spot check your intake every 2-3 weeks and make sure what you think you are getting is close to accurate; consider using a protein shake if needed; these are in the pharmacy section of the stores, not the grocery section; Premier, Pure Protein and Fairlife are relatively inexpensive and taste good to most patients; other options are Nectar, Boost Max, Ensure Max, BeneProtein and GNC lean (which is lactose-free); Nectar fruit, Premier Protein Clear, IsoPure Protein Drink, and Protein 2 O are water-based options; Quest (or [employer], which is cheaper and is ordered on [address] [place] [address]) and the [employer] protein bars can also be used, but have less protein in them )  (Disclaimer: Dietary supplements rarely have their listed ingredients and the amount of each verified by a third party other. Sometimes they give verification for their claims to be GMO and gluten free and to be organic. However, even such verifications as these may still be untrustworthy.)  Make sure that fiber intake is at least 22 grams per day. Do this by either eating 12 tablespoons of the original, plain Fiber One cereal every day or 4 tablespoons of wheat dextrin powder (Benefiber or a generic brand) every day. Work up to this amount slowly by starting with only one-eighth to one-fourth of the target amount and then adding another one-eighth to one-fourth every one or two weeks until reaching the target. Also, Nature Made fiber gummies with inulin is an option. Drink at least 64 oz of water each day  Take one multivitamin every day    Targets:  Limit calorie intake to 1200 calories/day  Walk 30 minutes daily  Avoid eating 2 hours within bedtime.      Tips:  Do not eat outside of the dining room or the kitchen  Do not eat while watching TV,

## 2024-07-11 ENCOUNTER — APPOINTMENT (OUTPATIENT)
Dept: CT IMAGING | Age: 64
End: 2024-07-11
Payer: MEDICARE

## 2024-07-11 ENCOUNTER — HOSPITAL ENCOUNTER (OUTPATIENT)
Age: 64
Setting detail: OBSERVATION
Discharge: HOME | End: 2024-07-15
Attending: STUDENT IN AN ORGANIZED HEALTH CARE EDUCATION/TRAINING PROGRAM | Admitting: INTERNAL MEDICINE
Payer: MEDICARE

## 2024-07-11 ENCOUNTER — APPOINTMENT (OUTPATIENT)
Dept: GENERAL RADIOLOGY | Age: 64
End: 2024-07-11
Payer: MEDICARE

## 2024-07-11 DIAGNOSIS — R29.90 STROKE-LIKE SYMPTOMS: Primary | ICD-10-CM

## 2024-07-11 DIAGNOSIS — I63.9 ACUTE CVA (CEREBROVASCULAR ACCIDENT) (HCC): ICD-10-CM

## 2024-07-11 DIAGNOSIS — I63.9 CEREBROVASCULAR ACCIDENT (CVA), UNSPECIFIED MECHANISM (HCC): ICD-10-CM

## 2024-07-11 LAB
BASOPHILS # BLD: 0.05 K/UL (ref 0–0.2)
BASOPHILS NFR BLD: 0 % (ref 0–2)
EOSINOPHIL # BLD: 0.12 K/UL (ref 0.05–0.5)
EOSINOPHILS RELATIVE PERCENT: 1 % (ref 0–6)
ERYTHROCYTE [DISTWIDTH] IN BLOOD BY AUTOMATED COUNT: 14.6 % (ref 11.5–15)
GLUCOSE BLD-MCNC: 107 MG/DL (ref 74–99)
HCT VFR BLD AUTO: 46 % (ref 34–48)
HGB BLD-MCNC: 15.1 G/DL (ref 11.5–15.5)
IMM GRANULOCYTES # BLD AUTO: 0.13 K/UL (ref 0–0.58)
IMM GRANULOCYTES NFR BLD: 1 % (ref 0–5)
LYMPHOCYTES NFR BLD: 4.71 K/UL (ref 1.5–4)
LYMPHOCYTES RELATIVE PERCENT: 23 % (ref 20–42)
MCH RBC QN AUTO: 30.3 PG (ref 26–35)
MCHC RBC AUTO-ENTMCNC: 32.8 G/DL (ref 32–34.5)
MCV RBC AUTO: 92.2 FL (ref 80–99.9)
MONOCYTES NFR BLD: 1.43 K/UL (ref 0.1–0.95)
MONOCYTES NFR BLD: 7 % (ref 2–12)
NEUTROPHILS NFR BLD: 68 % (ref 43–80)
NEUTS SEG NFR BLD: 13.73 K/UL (ref 1.8–7.3)
PLATELET # BLD AUTO: 465 K/UL (ref 130–450)
PMV BLD AUTO: 10.2 FL (ref 7–12)
RBC # BLD AUTO: 4.99 M/UL (ref 3.5–5.5)
WBC OTHER # BLD: 20.2 K/UL (ref 4.5–11.5)

## 2024-07-11 PROCEDURE — 93005 ELECTROCARDIOGRAM TRACING: CPT | Performed by: STUDENT IN AN ORGANIZED HEALTH CARE EDUCATION/TRAINING PROGRAM

## 2024-07-11 PROCEDURE — 70450 CT HEAD/BRAIN W/O DYE: CPT

## 2024-07-11 PROCEDURE — 6360000004 HC RX CONTRAST MEDICATION: Performed by: RADIOLOGY

## 2024-07-11 PROCEDURE — 85025 COMPLETE CBC W/AUTO DIFF WBC: CPT

## 2024-07-11 PROCEDURE — 80053 COMPREHEN METABOLIC PANEL: CPT

## 2024-07-11 PROCEDURE — 99285 EMERGENCY DEPT VISIT HI MDM: CPT

## 2024-07-11 PROCEDURE — 84484 ASSAY OF TROPONIN QUANT: CPT

## 2024-07-11 PROCEDURE — 70498 CT ANGIOGRAPHY NECK: CPT

## 2024-07-11 PROCEDURE — 82962 GLUCOSE BLOOD TEST: CPT

## 2024-07-11 PROCEDURE — 70496 CT ANGIOGRAPHY HEAD: CPT

## 2024-07-11 PROCEDURE — G0425 INPT/ED TELECONSULT30: HCPCS | Performed by: PSYCHIATRY & NEUROLOGY

## 2024-07-11 PROCEDURE — 71045 X-RAY EXAM CHEST 1 VIEW: CPT

## 2024-07-11 RX ADMIN — IOPAMIDOL 75 ML: 755 INJECTION, SOLUTION INTRAVENOUS at 23:10

## 2024-07-11 ASSESSMENT — PAIN - FUNCTIONAL ASSESSMENT: PAIN_FUNCTIONAL_ASSESSMENT: NONE - DENIES PAIN

## 2024-07-11 ASSESSMENT — LIFESTYLE VARIABLES
HOW MANY STANDARD DRINKS CONTAINING ALCOHOL DO YOU HAVE ON A TYPICAL DAY: 1 OR 2
HOW OFTEN DO YOU HAVE A DRINK CONTAINING ALCOHOL: MONTHLY OR LESS

## 2024-07-12 LAB
ALBUMIN SERPL-MCNC: 3.6 G/DL (ref 3.5–5.2)
ALP SERPL-CCNC: 89 U/L (ref 35–104)
ALT SERPL-CCNC: 36 U/L (ref 0–32)
ANION GAP SERPL CALCULATED.3IONS-SCNC: 9 MMOL/L (ref 7–16)
AST SERPL-CCNC: 25 U/L (ref 0–31)
BILIRUB SERPL-MCNC: 0.3 MG/DL (ref 0–1.2)
BUN SERPL-MCNC: 17 MG/DL (ref 6–23)
CALCIUM SERPL-MCNC: 8.5 MG/DL (ref 8.6–10.2)
CHLORIDE SERPL-SCNC: 106 MMOL/L (ref 98–107)
CO2 SERPL-SCNC: 24 MMOL/L (ref 22–29)
CREAT SERPL-MCNC: 1 MG/DL (ref 0.5–1)
EKG ATRIAL RATE: 64 BPM
EKG P AXIS: 18 DEGREES
EKG P-R INTERVAL: 144 MS
EKG Q-T INTERVAL: 412 MS
EKG QRS DURATION: 112 MS
EKG QTC CALCULATION (BAZETT): 425 MS
EKG R AXIS: -27 DEGREES
EKG T AXIS: 14 DEGREES
EKG VENTRICULAR RATE: 64 BPM
GFR, ESTIMATED: 61 ML/MIN/1.73M2
GLUCOSE SERPL-MCNC: 100 MG/DL (ref 74–99)
POTASSIUM SERPL-SCNC: 4 MMOL/L (ref 3.5–5)
PROT SERPL-MCNC: 6.6 G/DL (ref 6.4–8.3)
SODIUM SERPL-SCNC: 139 MMOL/L (ref 132–146)
TROPONIN I SERPL HS-MCNC: 8 NG/L (ref 0–9)

## 2024-07-12 PROCEDURE — 93010 ELECTROCARDIOGRAM REPORT: CPT | Performed by: INTERNAL MEDICINE

## 2024-07-12 PROCEDURE — 80053 COMPREHEN METABOLIC PANEL: CPT

## 2024-07-12 PROCEDURE — 6370000000 HC RX 637 (ALT 250 FOR IP): Performed by: STUDENT IN AN ORGANIZED HEALTH CARE EDUCATION/TRAINING PROGRAM

## 2024-07-12 PROCEDURE — 84484 ASSAY OF TROPONIN QUANT: CPT

## 2024-07-12 RX ORDER — LEVOTHYROXINE SODIUM 0.05 MG/1
50 TABLET ORAL DAILY
Status: DISCONTINUED | OUTPATIENT
Start: 2024-07-13 | End: 2024-07-15 | Stop reason: HOSPADM

## 2024-07-12 RX ORDER — CLOPIDOGREL 300 MG/1
300 TABLET, FILM COATED ORAL ONCE
Status: COMPLETED | OUTPATIENT
Start: 2024-07-12 | End: 2024-07-12

## 2024-07-12 RX ORDER — LOSARTAN POTASSIUM 25 MG/1
25 TABLET ORAL DAILY
Status: DISCONTINUED | OUTPATIENT
Start: 2024-07-13 | End: 2024-07-15 | Stop reason: HOSPADM

## 2024-07-12 RX ORDER — ASPIRIN 81 MG/1
324 TABLET, CHEWABLE ORAL ONCE
Status: COMPLETED | OUTPATIENT
Start: 2024-07-12 | End: 2024-07-12

## 2024-07-12 RX ADMIN — CLOPIDOGREL BISULFATE 300 MG: 300 TABLET, FILM COATED ORAL at 01:02

## 2024-07-12 RX ADMIN — ASPIRIN 81 MG 324 MG: 81 TABLET ORAL at 01:01

## 2024-07-12 NOTE — VIRTUAL HEALTH
97.8 °F (36.6 °C) (Temporal)   Resp 18   Ht 1.575 m (5' 2\")   Wt 72.6 kg (160 lb)   SpO2 98%   BMI 29.26 kg/m²   NIH STROKE SCALE      1a.  Level of consciousness:  0 - alert; keenly responsive  1b.  Level of consciousness questions:  0 - answers both questions correctly  1c.  Level of consciousness questions:  0 - performs both tasks correctly  2.    Best Gaze:  0 - normal  3.    Visual:  0 - no visual loss  4.    Facial Palsy:  0 - normal symmetric movement  5a.  Motor left arm:  0 - no drift, limb holds 90 (or 45) degrees for full 10 seconds  5b.  Motor right arm:  0 - no drift, limb holds 90 (or 45) degrees for full 10 seconds  6a.  Motor left le - no drift; leg holds 30 degree position for full 5 seconds  6b.  Motor right le - no drift; leg holds 30 degree position for full 5 seconds  7.    Limb Ataxia:  1 - present in one limb  8.    Sensory:  1 - mild to moderate sensory loss; patient feels pinprick is less sharp or is dull on the affected side; there is a loss of superficial pain with pinprick but patient is aware of being touched   9.    Best Language:  0 - no aphasia, normal  10.  Dysarthria:  0 - normal  11.  Extinction and Inattention:  0 - no abnormality    TOTAL:  2       Pre-Morbid mRS: 0    Imaging:  Images were personally reviewed with MELANY used to review images including:  CT brain without contrast: no acute abnormalities  CTA imaging: no LVO or significant stenosis     Assessment      Differential DDx:  Acute onset distal LUE weakness, ataxia, sensory deficit concern for acute R hemispheric infarct.   HTN urgency       Recommendations:  NIH 2  Patient is an IV-TNK candidate:  No history of recent bleeding, no history of brain bleed/aneurysm/brain tumor, or recent surgery/trauma.  IV-TNK Consent: I have informed the patient and/or family of all the associated risks including 1% of sich/death, benefits of potential improved thrombolysis, and alternative of using IV tenectaplase

## 2024-07-12 NOTE — ED NOTES
Stroke/ Edna Alert Time: 2303      Time Neurologist called: 2306  :    Time CT Notified: 2303      BRAIN alert time: (If applicable)  Stroke/ Edna Alert Time:

## 2024-07-12 NOTE — ED PROVIDER NOTES
HPI   This is a 64-year-old female patient who presents emergency department for sudden onset of left arm weakness, left arm decreased sensation.  She states she was playing cards at 8:30 PM when symptoms started suddenly.  She denies any chest pain or shortness of breath.  No vision changes, no slurred speech or word finding difficulties.  She denies any injuries or falls.  She denies any history of stroke, MI or diabetes.  She does not smoke.  No numbness or weakness anywhere else.  Review of Systems   Constitutional:  Negative for chills and fever.   HENT:  Negative for congestion.    Respiratory:  Negative for cough and shortness of breath.    Cardiovascular:  Negative for chest pain.   Gastrointestinal:  Negative for abdominal pain, diarrhea, nausea and vomiting.   Genitourinary:  Negative for difficulty urinating, dysuria and hematuria.   Musculoskeletal:  Negative for back pain.   Skin:  Negative for color change.   Neurological:         Left arm weakness, sensory changes.   All other systems reviewed and are negative.       Physical Exam  Vitals and nursing note reviewed.   Constitutional:       Appearance: Normal appearance.   HENT:      Head: Normocephalic and atraumatic.      Nose: Nose normal. No congestion.      Mouth/Throat:      Mouth: Mucous membranes are moist.      Pharynx: Oropharynx is clear.   Eyes:      Conjunctiva/sclera: Conjunctivae normal.      Pupils: Pupils are equal, round, and reactive to light.   Cardiovascular:      Rate and Rhythm: Normal rate and regular rhythm.      Pulses: Normal pulses.      Heart sounds: Normal heart sounds.   Pulmonary:      Effort: Pulmonary effort is normal. No respiratory distress.      Breath sounds: Normal breath sounds.   Abdominal:      General: Bowel sounds are normal. There is no distension.      Tenderness: There is no abdominal tenderness.   Musculoskeletal:         General: Normal range of motion.      Cervical back: Normal range of motion and

## 2024-07-12 NOTE — PLAN OF CARE
Stroke ---     Shortly after evaluation and discussion with patient I was notified that patient changed her mind about receiving TNK. She wants to forego the treatment as she does not want to risk any potential side effects of the TNK.   NIHSS continues to be a 2.   Repeat SBP is 162  Will go ahead and load  mg, and Plavix 300 mg now instead.   Pt still to be transferred to [facility] for further neuro and stroke work up  Recommend MRI in AM.   Discussed with Dr. Keane.     Lonnie Gilbert, [facility] [place]  Neurology

## 2024-07-13 ENCOUNTER — APPOINTMENT (OUTPATIENT)
Dept: MRI IMAGING | Age: 64
End: 2024-07-13
Payer: MEDICARE

## 2024-07-13 PROBLEM — I63.9 ACUTE CVA (CEREBROVASCULAR ACCIDENT) (HCC): Status: ACTIVE | Noted: 2024-07-13

## 2024-07-13 PROBLEM — I63.9 STROKE (CEREBRUM) (HCC): Status: ACTIVE | Noted: 2024-07-13

## 2024-07-13 LAB
ALBUMIN SERPL-MCNC: 3.8 G/DL (ref 3.5–5.2)
ALP SERPL-CCNC: 102 U/L (ref 35–104)
ALT SERPL-CCNC: 31 U/L (ref 0–32)
ANION GAP SERPL CALCULATED.3IONS-SCNC: 11 MMOL/L (ref 7–16)
AST SERPL-CCNC: 21 U/L (ref 0–31)
BASOPHILS # BLD: 0.06 K/UL (ref 0–0.2)
BASOPHILS NFR BLD: 0 % (ref 0–2)
BILIRUB SERPL-MCNC: 1.2 MG/DL (ref 0–1.2)
BUN SERPL-MCNC: 13 MG/DL (ref 6–23)
CALCIUM SERPL-MCNC: 8.9 MG/DL (ref 8.6–10.2)
CHLORIDE SERPL-SCNC: 104 MMOL/L (ref 98–107)
CHOLEST SERPL-MCNC: 182 MG/DL
CO2 SERPL-SCNC: 21 MMOL/L (ref 22–29)
CREAT SERPL-MCNC: 0.7 MG/DL (ref 0.5–1)
EOSINOPHIL # BLD: 0.11 K/UL (ref 0.05–0.5)
EOSINOPHILS RELATIVE PERCENT: 1 % (ref 0–6)
ERYTHROCYTE [DISTWIDTH] IN BLOOD BY AUTOMATED COUNT: 14.5 % (ref 11.5–15)
GFR, ESTIMATED: >90 ML/MIN/1.73M2
GLUCOSE SERPL-MCNC: 82 MG/DL (ref 74–99)
HCT VFR BLD AUTO: 44.8 % (ref 34–48)
HDLC SERPL-MCNC: 50 MG/DL
HGB BLD-MCNC: 14.5 G/DL (ref 11.5–15.5)
IMM GRANULOCYTES # BLD AUTO: 0.12 K/UL (ref 0–0.58)
IMM GRANULOCYTES NFR BLD: 1 % (ref 0–5)
LDLC SERPL CALC-MCNC: 111 MG/DL
LYMPHOCYTES NFR BLD: 3.75 K/UL (ref 1.5–4)
LYMPHOCYTES RELATIVE PERCENT: 19 % (ref 20–42)
MCH RBC QN AUTO: 30 PG (ref 26–35)
MCHC RBC AUTO-ENTMCNC: 32.4 G/DL (ref 32–34.5)
MCV RBC AUTO: 92.6 FL (ref 80–99.9)
MONOCYTES NFR BLD: 1.14 K/UL (ref 0.1–0.95)
MONOCYTES NFR BLD: 6 % (ref 2–12)
NEUTROPHILS NFR BLD: 73 % (ref 43–80)
NEUTS SEG NFR BLD: 14.3 K/UL (ref 1.8–7.3)
PLATELET # BLD AUTO: 408 K/UL (ref 130–450)
PMV BLD AUTO: 9.7 FL (ref 7–12)
POTASSIUM SERPL-SCNC: 4.1 MMOL/L (ref 3.5–5)
PROT SERPL-MCNC: 7.5 G/DL (ref 6.4–8.3)
RBC # BLD AUTO: 4.84 M/UL (ref 3.5–5.5)
SODIUM SERPL-SCNC: 136 MMOL/L (ref 132–146)
TRIGL SERPL-MCNC: 106 MG/DL
VLDLC SERPL CALC-MCNC: 21 MG/DL
WBC OTHER # BLD: 19.5 K/UL (ref 4.5–11.5)

## 2024-07-13 PROCEDURE — 80053 COMPREHEN METABOLIC PANEL: CPT

## 2024-07-13 PROCEDURE — 85025 COMPLETE CBC W/AUTO DIFF WBC: CPT

## 2024-07-13 PROCEDURE — 6370000000 HC RX 637 (ALT 250 FOR IP): Performed by: PSYCHIATRY & NEUROLOGY

## 2024-07-13 PROCEDURE — 80061 LIPID PANEL: CPT

## 2024-07-13 PROCEDURE — 70551 MRI BRAIN STEM W/O DYE: CPT

## 2024-07-13 PROCEDURE — 72141 MRI NECK SPINE W/O DYE: CPT

## 2024-07-13 PROCEDURE — 6370000000 HC RX 637 (ALT 250 FOR IP): Performed by: EMERGENCY MEDICINE

## 2024-07-13 PROCEDURE — G0378 HOSPITAL OBSERVATION PER HR: HCPCS

## 2024-07-13 RX ORDER — PROMETHAZINE HYDROCHLORIDE 25 MG/ML
25 INJECTION, SOLUTION INTRAMUSCULAR; INTRAVENOUS EVERY 6 HOURS PRN
Status: DISCONTINUED | OUTPATIENT
Start: 2024-07-13 | End: 2024-07-15 | Stop reason: HOSPADM

## 2024-07-13 RX ORDER — MECLIZINE HCL 12.5 MG/1
25 TABLET ORAL 3 TIMES DAILY PRN
Status: DISCONTINUED | OUTPATIENT
Start: 2024-07-13 | End: 2024-07-15 | Stop reason: HOSPADM

## 2024-07-13 RX ORDER — LIOTHYRONINE SODIUM 5 UG/1
5 TABLET ORAL DAILY
Status: DISCONTINUED | OUTPATIENT
Start: 2024-07-14 | End: 2024-07-15 | Stop reason: HOSPADM

## 2024-07-13 RX ORDER — ONDANSETRON 2 MG/ML
4 INJECTION INTRAMUSCULAR; INTRAVENOUS EVERY 6 HOURS PRN
Status: DISCONTINUED | OUTPATIENT
Start: 2024-07-13 | End: 2024-07-15 | Stop reason: HOSPADM

## 2024-07-13 RX ORDER — ATORVASTATIN CALCIUM 20 MG/1
20 TABLET, FILM COATED ORAL NIGHTLY
Status: DISCONTINUED | OUTPATIENT
Start: 2024-07-13 | End: 2024-07-14

## 2024-07-13 RX ORDER — PANTOPRAZOLE SODIUM 40 MG/1
40 TABLET, DELAYED RELEASE ORAL
Status: DISCONTINUED | OUTPATIENT
Start: 2024-07-14 | End: 2024-07-15 | Stop reason: HOSPADM

## 2024-07-13 RX ORDER — ACETAMINOPHEN 650 MG/1
650 SUPPOSITORY RECTAL EVERY 4 HOURS PRN
Status: DISCONTINUED | OUTPATIENT
Start: 2024-07-13 | End: 2024-07-15 | Stop reason: HOSPADM

## 2024-07-13 RX ORDER — VITAMIN B COMPLEX
1000 TABLET ORAL DAILY
Status: DISCONTINUED | OUTPATIENT
Start: 2024-07-14 | End: 2024-07-15 | Stop reason: HOSPADM

## 2024-07-13 RX ORDER — ACETAMINOPHEN 325 MG/1
650 TABLET ORAL EVERY 6 HOURS PRN
Status: DISCONTINUED | OUTPATIENT
Start: 2024-07-13 | End: 2024-07-15 | Stop reason: HOSPADM

## 2024-07-13 RX ORDER — ASPIRIN 81 MG/1
81 TABLET ORAL DAILY
Status: DISCONTINUED | OUTPATIENT
Start: 2024-07-13 | End: 2024-07-15 | Stop reason: HOSPADM

## 2024-07-13 RX ADMIN — ATORVASTATIN CALCIUM 20 MG: 20 TABLET, FILM COATED ORAL at 21:02

## 2024-07-13 RX ADMIN — LEVOTHYROXINE SODIUM 50 MCG: 0.05 TABLET ORAL at 07:34

## 2024-07-13 RX ADMIN — ASPIRIN 81 MG: 81 TABLET, COATED ORAL at 11:47

## 2024-07-13 RX ADMIN — LOSARTAN POTASSIUM 25 MG: 50 TABLET, FILM COATED ORAL at 09:19

## 2024-07-14 LAB
25(OH)D3 SERPL-MCNC: 38.8 NG/ML (ref 30–100)
ALBUMIN SERPL-MCNC: 3.7 G/DL (ref 3.5–5.2)
ALP SERPL-CCNC: 103 U/L (ref 35–104)
ALT SERPL-CCNC: 28 U/L (ref 0–32)
ANION GAP SERPL CALCULATED.3IONS-SCNC: 11 MMOL/L (ref 7–16)
AST SERPL-CCNC: 19 U/L (ref 0–31)
BASOPHILS # BLD: 0.06 K/UL (ref 0–0.2)
BASOPHILS NFR BLD: 0 % (ref 0–2)
BILIRUB SERPL-MCNC: 1.3 MG/DL (ref 0–1.2)
BUN SERPL-MCNC: 17 MG/DL (ref 6–23)
CALCIUM SERPL-MCNC: 8.8 MG/DL (ref 8.6–10.2)
CHLORIDE SERPL-SCNC: 105 MMOL/L (ref 98–107)
CHOLEST SERPL-MCNC: 179 MG/DL
CO2 SERPL-SCNC: 20 MMOL/L (ref 22–29)
CREAT SERPL-MCNC: 0.7 MG/DL (ref 0.5–1)
EOSINOPHIL # BLD: 0.15 K/UL (ref 0.05–0.5)
EOSINOPHILS RELATIVE PERCENT: 1 % (ref 0–6)
ERYTHROCYTE [DISTWIDTH] IN BLOOD BY AUTOMATED COUNT: 14.4 % (ref 11.5–15)
FOLATE SERPL-MCNC: >20 NG/ML (ref 4.8–24.2)
GFR, ESTIMATED: >90 ML/MIN/1.73M2
GLUCOSE SERPL-MCNC: 84 MG/DL (ref 74–99)
HCT VFR BLD AUTO: 43.2 % (ref 34–48)
HDLC SERPL-MCNC: 47 MG/DL
HGB BLD-MCNC: 14.3 G/DL (ref 11.5–15.5)
IMM GRANULOCYTES # BLD AUTO: 0.15 K/UL (ref 0–0.58)
IMM GRANULOCYTES NFR BLD: 1 % (ref 0–5)
IRON SATN MFR SERPL: 33 % (ref 15–50)
IRON SERPL-MCNC: 108 UG/DL (ref 37–145)
LDLC SERPL CALC-MCNC: 116 MG/DL
LYMPHOCYTES NFR BLD: 3.1 K/UL (ref 1.5–4)
LYMPHOCYTES RELATIVE PERCENT: 18 % (ref 20–42)
MCH RBC QN AUTO: 30.6 PG (ref 26–35)
MCHC RBC AUTO-ENTMCNC: 33.1 G/DL (ref 32–34.5)
MCV RBC AUTO: 92.3 FL (ref 80–99.9)
MONOCYTES NFR BLD: 1.08 K/UL (ref 0.1–0.95)
MONOCYTES NFR BLD: 6 % (ref 2–12)
NEUTROPHILS NFR BLD: 75 % (ref 43–80)
NEUTS SEG NFR BLD: 13.18 K/UL (ref 1.8–7.3)
PLATELET # BLD AUTO: 392 K/UL (ref 130–450)
PMV BLD AUTO: 10 FL (ref 7–12)
POTASSIUM SERPL-SCNC: 4.2 MMOL/L (ref 3.5–5)
PROT SERPL-MCNC: 7 G/DL (ref 6.4–8.3)
RBC # BLD AUTO: 4.68 M/UL (ref 3.5–5.5)
SODIUM SERPL-SCNC: 136 MMOL/L (ref 132–146)
T4 FREE SERPL-MCNC: 1.5 NG/DL (ref 0.9–1.7)
TIBC SERPL-MCNC: 326 UG/DL (ref 250–450)
TRIGL SERPL-MCNC: 80 MG/DL
TROPONIN I SERPL HS-MCNC: 9 NG/L (ref 0–9)
TSH SERPL DL<=0.05 MIU/L-ACNC: 0.92 UIU/ML (ref 0.27–4.2)
VIT B12 SERPL-MCNC: 534 PG/ML (ref 211–946)
VLDLC SERPL CALC-MCNC: 16 MG/DL
WBC OTHER # BLD: 17.7 K/UL (ref 4.5–11.5)

## 2024-07-14 PROCEDURE — 82306 VITAMIN D 25 HYDROXY: CPT

## 2024-07-14 PROCEDURE — 83540 ASSAY OF IRON: CPT

## 2024-07-14 PROCEDURE — 85025 COMPLETE CBC W/AUTO DIFF WBC: CPT

## 2024-07-14 PROCEDURE — 84439 ASSAY OF FREE THYROXINE: CPT

## 2024-07-14 PROCEDURE — G0378 HOSPITAL OBSERVATION PER HR: HCPCS

## 2024-07-14 PROCEDURE — 6370000000 HC RX 637 (ALT 250 FOR IP): Performed by: EMERGENCY MEDICINE

## 2024-07-14 PROCEDURE — 6370000000 HC RX 637 (ALT 250 FOR IP)

## 2024-07-14 PROCEDURE — 82607 VITAMIN B-12: CPT

## 2024-07-14 PROCEDURE — 93246 EXT ECG>7D<15D RECORDING: CPT

## 2024-07-14 PROCEDURE — 80053 COMPREHEN METABOLIC PANEL: CPT

## 2024-07-14 PROCEDURE — 36415 COLL VENOUS BLD VENIPUNCTURE: CPT

## 2024-07-14 PROCEDURE — 83550 IRON BINDING TEST: CPT

## 2024-07-14 PROCEDURE — 80061 LIPID PANEL: CPT

## 2024-07-14 PROCEDURE — 82746 ASSAY OF FOLIC ACID SERUM: CPT

## 2024-07-14 PROCEDURE — 84443 ASSAY THYROID STIM HORMONE: CPT

## 2024-07-14 PROCEDURE — 84484 ASSAY OF TROPONIN QUANT: CPT

## 2024-07-14 PROCEDURE — 6370000000 HC RX 637 (ALT 250 FOR IP): Performed by: PSYCHIATRY & NEUROLOGY

## 2024-07-14 RX ORDER — CLOPIDOGREL BISULFATE 75 MG/1
75 TABLET ORAL DAILY
Status: DISCONTINUED | OUTPATIENT
Start: 2024-07-14 | End: 2024-07-15 | Stop reason: HOSPADM

## 2024-07-14 RX ORDER — ATORVASTATIN CALCIUM 40 MG/1
40 TABLET, FILM COATED ORAL NIGHTLY
Status: DISCONTINUED | OUTPATIENT
Start: 2024-07-14 | End: 2024-07-15 | Stop reason: HOSPADM

## 2024-07-14 RX ADMIN — LOSARTAN POTASSIUM 25 MG: 50 TABLET, FILM COATED ORAL at 07:48

## 2024-07-14 RX ADMIN — CLOPIDOGREL BISULFATE 75 MG: 75 TABLET ORAL at 09:31

## 2024-07-14 RX ADMIN — ASPIRIN 81 MG: 81 TABLET, COATED ORAL at 07:48

## 2024-07-14 RX ADMIN — Medication 1000 UNITS: at 07:48

## 2024-07-14 RX ADMIN — ATORVASTATIN CALCIUM 40 MG: 40 TABLET, FILM COATED ORAL at 19:46

## 2024-07-14 RX ADMIN — PANTOPRAZOLE SODIUM 40 MG: 40 TABLET, DELAYED RELEASE ORAL at 05:30

## 2024-07-14 RX ADMIN — LEVOTHYROXINE SODIUM 50 MCG: 0.05 TABLET ORAL at 05:30

## 2024-07-14 RX ADMIN — LIOTHYRONINE SODIUM 5 MCG: 5 TABLET ORAL at 09:31

## 2024-07-14 ASSESSMENT — PAIN SCALES - GENERAL: PAINLEVEL_OUTOF10: 0

## 2024-07-14 NOTE — ED NOTES
Patient is a [facility] patient.  Patient was [place] while we do not have neurology here at [facility] at.  We now have neurology here.  Patient already has an MRI.  Patient did have repeat labs drawn that are stable.  I discussed case with internal medicine agreed to admit patient here at [facility].  This hospital now has neurology coverage.     Bart Saeed MD  07/13/24 2027

## 2024-07-14 NOTE — H&P
tablet Take 50 mcg by mouth Daily    Derick Sánchez MD   Omeprazole 20 MG TBDD Take 1 capsule by mouth 2 times daily    Derick Sánchez MD   liothyronine (CYTOMEL) 5 MCG tablet Take 5 mcg by mouth daily    Derick Sánchez MD   losartan (COZAAR) 25 MG tablet Take 25 mg by mouth in the morning.    Derick Sánchez MD   vitamin D 25 MCG (1000 UT) CAPS Take 1 capsule by mouth in the morning.    Derick Sánchez MD   meclizine (ANTIVERT) 25 MG tablet Take 25 mg by mouth 3 times daily as needed for Dizziness    Derick Sánchez MD       ALLERGIES:  Naproxen    SOCIAL Hx:  Social History     Socioeconomic History    Marital status: Single     Spouse name: Not on file    Number of children: Not on file    Years of education: Not on file    Highest education level: Not on file   Occupational History    Not on file   Tobacco Use    Smoking status: Never    Smokeless tobacco: Never   Substance and Sexual Activity    Alcohol use: Yes     Comment: socially    Drug use: Never    Sexual activity: Never   Other Topics Concern    Not on file   Social History Narrative    Not on file     Social Determinants of Health     Financial Resource Strain: Not on file   Food Insecurity: Not on file   Transportation Needs: Not on file   Physical Activity: Not on file   Stress: Not on file   Social Connections: Not on file   Intimate Partner Violence: Not on file   Housing Stability: Not on file       ROS:  General:   Denies chills, fatigue, fever, malaise, night sweats or weight loss    Psychological:   Denies anxiety, disorientation or hallucinations    ENT:    Denies epistaxis, headaches, vertigo or visual changes    Cardiovascular:   Denies any chest pain, irregular heartbeats, or palpitations. No paroxysmal nocturnal dyspnea.    Respiratory:   Denies shortness of breath, coughing, sputum production, hemoptysis, or wheezing.  No orthopnea.    Gastrointestinal:   Denies nausea, vomiting, diarrhea, or

## 2024-07-14 NOTE — CONSULTS
History Of Present Illness: Candie Kramer is a 64 y.o. female who presents with L arm weakness, numbness. Symptom onset has been acute for a time period of 3 hour(s). Severity is described as moderate. Course of her symptoms over time is constant. Was playing cards, suddenly felt as though L hand was not working correctly. Having difficulty with hand , finger coordination and forearm movement. Also c/o tingling in same region. There is ataxia with FNF testing.   As above per telemedicine.    The patient is a 64 y.o. female with significant past medical history of see below who presents with above.      The patient has the following symptoms:    Change in level of consciousness: alert    New Weakness: yes    Numbness or Tingling: no    Difficulty Swallowing: no    Current Medications:   Scheduled Meds:   losartan  25 mg Oral Daily    levothyroxine  50 mcg Oral Daily     Continuous Infusions:  PRN Meds:    Allergies:  Naproxen    Social History:   TOBACCO:   reports that she has never smoked. She has never used smokeless tobacco.  ETOH:   reports current alcohol use.    Past Medical History:        Diagnosis Date    GERD (gastroesophageal reflux disease)     Obesity     SIMBA (obstructive sleep apnea)     Other specified hypothyroidism     Secondary hypertension     Vertigo        Past Surgical History:        Procedure Laterality Date    ANKLE SURGERY      BACK SURGERY      CHOLECYSTECTOMY           Outside reports reviewed: ER records, historical medical records, lab reports and radiology reports.    Patient's medications, allergies, past medical, surgical, social and family histories were reviewed and updated as appropriate.    Review of Systems  A comprehensive review of systems was negative except for:       Objective:     Neuro exam 152/70 p 62 t not recorded.  General: normal orientation and alertness.  Cranial nerve testing was normal.  Funduscopic eye exam revealed not testable.  Motor exam:  full except LUE 
      Family History   Problem Relation Age of Onset    Heart Disease Sister        Review Of Systems:   CONSTITUTIONAL: Generalized body weakness.  No fever or chills.  HEENT: No nosebleed.  No double vision.  No stridor.  No hoarseness of the voice.  No hearing impairment.  RESPIRATORY: No shortness of breath.  No cough.  CARDIOVASCULAR: No chest pain.  No palpitation.  GASTROINTESTINAL: No abdominal pain.  No nausea.  No vomiting.  GENITOURINARY: No dysuria or frequency.  No bladder or kidney tumor.  HEMATOLOGIC/LYMPHATIC: No bleeding disorder.  No adenopathy.  ENDOCRINE: No polyuria or polydipsia.  MUSCULOSKELETAL: Arthritis pain.  NEUROLOGICAL: No history of seizure or stroke.  No syncope.  BEHAVIOR/PSYCH: No depression or suicidal ideation.    Physical Exam:    General Appearance:  Alert, cooperative, no distress, appears stated age  Head:  Normocephalic, without obvious abnormality, atraumatic  HEENT: Fairly unremarkable findings.    Neck: Supple, no JVD  Lungs: Mildly diminished breath sounds in the bases, no wheezing  Chest Wall: No tenderness or deformity  Heart:  Regular rate and rhythm, S1, S2 normal,  I could not hear murmurs or rubs abdomen:  Soft, non-tender.  Extremities:  no cyanosis or edema  Skin: Skin color, texture, turgor normal, no rashes or lesions  Neurologic: No focal deficits.      Vitals:    07/13/24 2000 07/13/24 2100 07/14/24 0115 07/14/24 0640   BP: 118/64 117/84 (!) 142/77 132/62   Pulse: 79 80 80 67   Resp: 18 23 20 18   Temp:   97.9 °F (36.6 °C) 97.7 °F (36.5 °C)   TempSrc:   Oral Oral   SpO2: 92% 97% 97% 97%   Weight:   88.5 kg (195 lb)    Height:   1.575 m (5' 2\")        EKG: Sinus rhythm with rate of 64 a minute with incomplete left bundle branch block and left anterior fascicular block with poor R wave progression in leads V1 to V5.  Q waves is also seen in lead III and aVF.  Possibility of fall anterior and inferior infarctions cannot be excluded from this EKG    LABS:  BMP:

## 2024-07-15 ENCOUNTER — APPOINTMENT (OUTPATIENT)
Age: 64
End: 2024-07-15
Payer: MEDICARE

## 2024-07-15 VITALS
OXYGEN SATURATION: 95 % | SYSTOLIC BLOOD PRESSURE: 119 MMHG | RESPIRATION RATE: 17 BRPM | TEMPERATURE: 97.7 F | WEIGHT: 195 LBS | HEART RATE: 64 BPM | BODY MASS INDEX: 35.88 KG/M2 | DIASTOLIC BLOOD PRESSURE: 60 MMHG | HEIGHT: 62 IN

## 2024-07-15 LAB
ALBUMIN SERPL-MCNC: 3.6 G/DL (ref 3.5–5.2)
ALP SERPL-CCNC: 101 U/L (ref 35–104)
ALT SERPL-CCNC: 21 U/L (ref 0–32)
ANION GAP SERPL CALCULATED.3IONS-SCNC: 9 MMOL/L (ref 7–16)
AST SERPL-CCNC: 16 U/L (ref 0–31)
BASOPHILS # BLD: 0.06 K/UL (ref 0–0.2)
BASOPHILS NFR BLD: 0 % (ref 0–2)
BILIRUB SERPL-MCNC: 0.8 MG/DL (ref 0–1.2)
BUN SERPL-MCNC: 20 MG/DL (ref 6–23)
CALCIUM SERPL-MCNC: 8.9 MG/DL (ref 8.6–10.2)
CHLORIDE SERPL-SCNC: 110 MMOL/L (ref 98–107)
CO2 SERPL-SCNC: 21 MMOL/L (ref 22–29)
CREAT SERPL-MCNC: 0.7 MG/DL (ref 0.5–1)
EOSINOPHIL # BLD: 0.15 K/UL (ref 0.05–0.5)
EOSINOPHILS RELATIVE PERCENT: 1 % (ref 0–6)
ERYTHROCYTE [DISTWIDTH] IN BLOOD BY AUTOMATED COUNT: 14.4 % (ref 11.5–15)
GFR, ESTIMATED: 90 ML/MIN/1.73M2
GLUCOSE SERPL-MCNC: 95 MG/DL (ref 74–99)
HCT VFR BLD AUTO: 41.5 % (ref 34–48)
HGB BLD-MCNC: 13.6 G/DL (ref 11.5–15.5)
IMM GRANULOCYTES # BLD AUTO: 0.13 K/UL (ref 0–0.58)
IMM GRANULOCYTES NFR BLD: 1 % (ref 0–5)
LYMPHOCYTES NFR BLD: 3.14 K/UL (ref 1.5–4)
LYMPHOCYTES RELATIVE PERCENT: 18 % (ref 20–42)
MCH RBC QN AUTO: 30.3 PG (ref 26–35)
MCHC RBC AUTO-ENTMCNC: 32.8 G/DL (ref 32–34.5)
MCV RBC AUTO: 92.4 FL (ref 80–99.9)
MONOCYTES NFR BLD: 1.02 K/UL (ref 0.1–0.95)
MONOCYTES NFR BLD: 6 % (ref 2–12)
NEUTROPHILS NFR BLD: 75 % (ref 43–80)
NEUTS SEG NFR BLD: 13.42 K/UL (ref 1.8–7.3)
PLATELET # BLD AUTO: 379 K/UL (ref 130–450)
PMV BLD AUTO: 10.1 FL (ref 7–12)
POTASSIUM SERPL-SCNC: 4.3 MMOL/L (ref 3.5–5)
PROT SERPL-MCNC: 6.6 G/DL (ref 6.4–8.3)
RBC # BLD AUTO: 4.49 M/UL (ref 3.5–5.5)
SODIUM SERPL-SCNC: 140 MMOL/L (ref 132–146)
WBC OTHER # BLD: 17.9 K/UL (ref 4.5–11.5)

## 2024-07-15 PROCEDURE — 6370000000 HC RX 637 (ALT 250 FOR IP): Performed by: EMERGENCY MEDICINE

## 2024-07-15 PROCEDURE — C8929 TTE W OR WO FOL WCON,DOPPLER: HCPCS

## 2024-07-15 PROCEDURE — G0378 HOSPITAL OBSERVATION PER HR: HCPCS

## 2024-07-15 PROCEDURE — 80053 COMPREHEN METABOLIC PANEL: CPT

## 2024-07-15 PROCEDURE — 97161 PT EVAL LOW COMPLEX 20 MIN: CPT | Performed by: PHYSICAL THERAPIST

## 2024-07-15 PROCEDURE — 85025 COMPLETE CBC W/AUTO DIFF WBC: CPT

## 2024-07-15 PROCEDURE — 36415 COLL VENOUS BLD VENIPUNCTURE: CPT

## 2024-07-15 PROCEDURE — 6360000004 HC RX CONTRAST MEDICATION

## 2024-07-15 PROCEDURE — 6370000000 HC RX 637 (ALT 250 FOR IP): Performed by: PSYCHIATRY & NEUROLOGY

## 2024-07-15 PROCEDURE — 6370000000 HC RX 637 (ALT 250 FOR IP)

## 2024-07-15 RX ORDER — ASPIRIN 81 MG/1
81 TABLET ORAL DAILY
Qty: 30 TABLET | Refills: 3 | Status: SHIPPED | OUTPATIENT
Start: 2024-07-16

## 2024-07-15 RX ORDER — CLOPIDOGREL BISULFATE 75 MG/1
75 TABLET ORAL DAILY
Qty: 30 TABLET | Refills: 3 | Status: SHIPPED | OUTPATIENT
Start: 2024-07-16

## 2024-07-15 RX ORDER — ATORVASTATIN CALCIUM 40 MG/1
40 TABLET, FILM COATED ORAL NIGHTLY
Qty: 30 TABLET | Refills: 3 | Status: SHIPPED | OUTPATIENT
Start: 2024-07-15

## 2024-07-15 RX ADMIN — PERFLUTREN 1.5 ML: 6.52 INJECTION, SUSPENSION INTRAVENOUS at 11:25

## 2024-07-15 RX ADMIN — LIOTHYRONINE SODIUM 5 MCG: 5 TABLET ORAL at 09:12

## 2024-07-15 RX ADMIN — Medication 1000 UNITS: at 09:12

## 2024-07-15 RX ADMIN — LOSARTAN POTASSIUM 25 MG: 50 TABLET, FILM COATED ORAL at 09:11

## 2024-07-15 RX ADMIN — PANTOPRAZOLE SODIUM 40 MG: 40 TABLET, DELAYED RELEASE ORAL at 05:31

## 2024-07-15 RX ADMIN — ASPIRIN 81 MG: 81 TABLET, COATED ORAL at 09:11

## 2024-07-15 RX ADMIN — CLOPIDOGREL BISULFATE 75 MG: 75 TABLET ORAL at 09:12

## 2024-07-15 RX ADMIN — LEVOTHYROXINE SODIUM 50 MCG: 0.05 TABLET ORAL at 05:31

## 2024-07-15 NOTE — DISCHARGE INSTRUCTIONS
Your information:  Name: Candie Kramer  : 1960    Your instructions:    YOU ARE BEING DISCHARGED HOME. PLEASE MAKE AND KEEP YOUR FOLLOW UP APPOINTMENTS.IF YOU EXPERIENCE ANY OF THE FOLLOWING SYMPTOMS, CHEST PAIN, SHORTNESS OF BREATH, COUGHING UP BLOOD OR BLOODY SPUTUM, STOMACH PAIN OR CRAMPING, DARK, TARRY STOOLS, LOSS OF APPETITE, GENERAL NOT FEELING WELL, SIGNS AND SYMPTOMS OF INFECTION LIKE FEVER AND OR CHILLS, PLEASE CALL DR BERMAN OR RETURN TO THE EMERGENCY ROOM.    What to do after you leave the hospital:    Recommended diet: {diet:84477}    Recommended activity: {discharge activity:90093}        The following personal items were collected during your admission and were returned to you:    Belongings  Dental Appliances: None  Vision - Corrective Lenses: None  Hearing Aid: None  Clothing: Footwear, Pants, Shirt, Undergarments, At bedside  Jewelry: None  Body Piercings Removed: N/A  Electronic Devices: Cell Phone,   Weapons (Notify Protective Services/Security): None  Other Valuables: At home  Home Medications: None  Valuables Given To: Patient  Provide Name(s) of Who Valuable(s) Were Given To: n/a  Responsible person(s) in the waiting room: n/a    Information obtained by:  By signing below, I understand that if any problems occur once I leave the hospital I am to contact ***.  I understand and acknowledge receipt of the instructions indicated above.

## 2024-07-15 NOTE — DISCHARGE SUMMARY
Internal Medicine Progress Note     [facility]     Lior Escobedo D.O., ERNSTI.                         Elias Mast D.O., WES Valencia D.O.     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 She was actually seen here by neurologist and had evaluation; she has been in the ER for over forty hours. She states that on  she had sudden onset of weakness and decreased sensation to left arm while playing cards around . She did not have slurred speech or aphasia; does not have history of stroke, MI, or diabetes. Currently she has some residual weakness/heaviness in left arm. Otherwise her HPI is free from complaints.  Patient does not use nicotine, alcohol, marijuana, or illicit drugs. Lives at home alone, independent with ADLs, no home health services. She is agreeable to admission for overnight observation with likely discharge home tomorrow.    LABS::  Lab Results

## 2024-07-15 NOTE — PLAN OF CARE
Problem: Safety - Adult  Goal: Free from fall injury  7/14/2024 2114 by Jackeline Hernandez, RN  Outcome: Progressing  7/14/2024 0832 by Vaibhav Brennan, RN  Outcome: Progressing

## 2024-07-15 NOTE — CARE COORDINATION
Ss note: 7/15/2024 10:58 AM pt is observation status. Pt oor for Echo.Per IDR Dr. Mast relays pt to discharge today, outpt script in soft chart, sw left list of outpt therapy agencies at bedside. Soha Liriano, PAULO

## 2024-07-17 LAB
ECHO AV AREA PEAK VELOCITY: 2.1 CM2
ECHO AV AREA PEAK VELOCITY: 2.1 CM2
ECHO AV AREA PEAK VELOCITY: 2.3 CM2
ECHO AV AREA PEAK VELOCITY: 2.3 CM2
ECHO AV AREA VTI: 2.1 CM2
ECHO AV AREA/BSA VTI: 1.1 CM2/M2
ECHO AV CUSP MM: 1.8 CM
ECHO AV MEAN GRADIENT: 3 MMHG
ECHO AV MEAN VELOCITY: 0.8 M/S
ECHO AV PEAK GRADIENT: 5 MMHG
ECHO AV PEAK GRADIENT: 6 MMHG
ECHO AV PEAK VELOCITY: 1.1 M/S
ECHO AV PEAK VELOCITY: 1.2 M/S
ECHO AV VTI: 28.2 CM
ECHO BSA: 1.97 M2
ECHO EST RA PRESSURE: 3 MMHG
ECHO LA DIAMETER INDEX: 1.64 CM/M2
ECHO LA DIAMETER: 3.1 CM
ECHO LA VOL A-L A2C: 44 ML (ref 22–52)
ECHO LA VOL A-L A4C: 36 ML (ref 22–52)
ECHO LA VOL BP: 37 ML (ref 22–52)
ECHO LA VOL MOD A2C: 42 ML (ref 22–52)
ECHO LA VOL MOD A4C: 34 ML (ref 22–52)
ECHO LA VOL/BSA BIPLANE: 20 ML/M2 (ref 16–34)
ECHO LA VOLUME AREA LENGTH: 40 ML
ECHO LA VOLUME INDEX A-L A2C: 23 ML/M2 (ref 16–34)
ECHO LA VOLUME INDEX A-L A4C: 19 ML/M2 (ref 16–34)
ECHO LA VOLUME INDEX AREA LENGTH: 21 ML/M2 (ref 16–34)
ECHO LA VOLUME INDEX MOD A2C: 22 ML/M2 (ref 16–34)
ECHO LA VOLUME INDEX MOD A4C: 18 ML/M2 (ref 16–34)
ECHO LV EDV A2C: 72 ML
ECHO LV EDV A4C: 104 ML
ECHO LV EDV BP: 87 ML (ref 56–104)
ECHO LV EDV INDEX A4C: 55 ML/M2
ECHO LV EDV INDEX BP: 46 ML/M2
ECHO LV EDV NDEX A2C: 38 ML/M2
ECHO LV EJECTION FRACTION A2C: 17 %
ECHO LV EJECTION FRACTION A4C: 52 %
ECHO LV EJECTION FRACTION BIPLANE: 35 % (ref 55–100)
ECHO LV ESV A2C: 60 ML
ECHO LV ESV A4C: 50 ML
ECHO LV ESV BP: 56 ML (ref 19–49)
ECHO LV ESV INDEX A2C: 32 ML/M2
ECHO LV ESV INDEX A4C: 26 ML/M2
ECHO LV ESV INDEX BP: 30 ML/M2
ECHO LV FRACTIONAL SHORTENING: 21 % (ref 28–44)
ECHO LV INTERNAL DIMENSION DIASTOLE INDEX: 2.75 CM/M2
ECHO LV INTERNAL DIMENSION DIASTOLIC: 5.2 CM (ref 3.9–5.3)
ECHO LV INTERNAL DIMENSION SYSTOLIC INDEX: 2.17 CM/M2
ECHO LV INTERNAL DIMENSION SYSTOLIC: 4.1 CM
ECHO LV IVSD: 1.1 CM (ref 0.6–0.9)
ECHO LV IVSS: 1.2 CM
ECHO LV MASS 2D: 220.8 G (ref 67–162)
ECHO LV MASS INDEX 2D: 116.8 G/M2 (ref 43–95)
ECHO LV POSTERIOR WALL DIASTOLIC: 1.1 CM (ref 0.6–0.9)
ECHO LV POSTERIOR WALL SYSTOLIC: 1.5 CM
ECHO LV RELATIVE WALL THICKNESS RATIO: 0.42
ECHO LVOT AREA: 3.1 CM2
ECHO LVOT AV VTI INDEX: 0.7
ECHO LVOT DIAM: 2 CM
ECHO LVOT MEAN GRADIENT: 1 MMHG
ECHO LVOT PEAK GRADIENT: 3 MMHG
ECHO LVOT PEAK GRADIENT: 3 MMHG
ECHO LVOT PEAK VELOCITY: 0.8 M/S
ECHO LVOT PEAK VELOCITY: 0.9 M/S
ECHO LVOT STROKE VOLUME INDEX: 32.7 ML/M2
ECHO LVOT SV: 61.9 ML
ECHO LVOT VTI: 19.7 CM
ECHO MV "A" WAVE DURATION: 186.5 MSEC
ECHO MV A VELOCITY: 0.65 M/S
ECHO MV AREA PHT: 2.5 CM2
ECHO MV AREA VTI: 3.4 CM2
ECHO MV E DECELERATION TIME (DT): 205.5 MS
ECHO MV E VELOCITY: 0.36 M/S
ECHO MV E/A RATIO: 0.55
ECHO MV LVOT VTI INDEX: 0.93
ECHO MV MAX VELOCITY: 0.9 M/S
ECHO MV MEAN GRADIENT: 1 MMHG
ECHO MV MEAN VELOCITY: 0.4 M/S
ECHO MV PEAK GRADIENT: 3 MMHG
ECHO MV PRESSURE HALF TIME (PHT): 86.7 MS
ECHO MV VTI: 18.3 CM
ECHO PV MAX VELOCITY: 1.3 M/S
ECHO PV MEAN GRADIENT: 3 MMHG
ECHO PV MEAN VELOCITY: 0.8 M/S
ECHO PV PEAK GRADIENT: 6 MMHG
ECHO PV VTI: 24.9 CM
ECHO PVEIN A DURATION: 178.9 MS
ECHO PVEIN A VELOCITY: 0.3 M/S
ECHO PVEIN PEAK D VELOCITY: 0.3 M/S
ECHO PVEIN PEAK S VELOCITY: 0.5 M/S
ECHO PVEIN S/D RATIO: 1.7
ECHO RIGHT VENTRICULAR SYSTOLIC PRESSURE (RVSP): 22 MMHG
ECHO RV INTERNAL DIMENSION: 3.3 CM
ECHO RV LONGITUDINAL DIMENSION: 5.3 CM
ECHO RV MID DIMENSION: 2.4 CM
ECHO RVOT MEAN GRADIENT: 1 MMHG
ECHO RVOT PEAK GRADIENT: 3 MMHG
ECHO RVOT PEAK VELOCITY: 0.8 M/S
ECHO RVOT VTI: 14.7 CM
ECHO TV REGURGITANT MAX VELOCITY: 2.19 M/S
ECHO TV REGURGITANT PEAK GRADIENT: 19 MMHG

## 2024-07-17 NOTE — PROGRESS NOTES
4 Eyes Skin Assessment     NAME:  Candie Kramer  YOB: 1960  MEDICAL RECORD NUMBER:  [id number]    The patient is being assessed for  Admission    I agree that at least one RN has performed a thorough Head to Toe Skin Assessment on the patient. ALL assessment sites listed below have been assessed.      Areas assessed by both nurses:    Head, Face, Ears, Shoulders, Back, Chest, Arms, Elbows, Hands, Sacrum. Buttock, Coccyx, Ischium, Legs. Feet and Heels, and Under Medical Devices         Does the Patient have a Wound? No noted wound(s)       Paco Prevention initiated by RN: No  Wound Care Orders initiated by RN: No    Pressure Injury (Stage 3,4, Unstageable, DTI, NWPT, and Complex wounds) if present, place Wound referral order by RN under : No    New Ostomies, if present place, Ostomy referral order under : No     Nurse 1 eSignature: Electronically signed by Jackeline Hernandez RN on 7/14/24 at 1:35 AM EDT    **SHARE this note so that the co-signing nurse can place an eSignature**    Nurse 2 eSignature: Electronically signed by Lauren Couch RN on 7/14/24 at 1:36 AM EDT   
CLINICAL PHARMACY NOTE: MEDS TO BEDS    Total # of Prescriptions Filled: 3   The following medications were delivered to the patient:  Aspirin 81 mg  Atorvastatin 40 mg  Clopidogrel 75 mg    Additional Documentation:    
Cardiology  Progress Note      SUBJECTIVE:  Still with weakness in the left arm.  No other complaints.    Current Inpatient Medications  Current Facility-Administered Medications: clopidogrel (PLAVIX) tablet 75 mg, 75 mg, Oral, Daily  perflutren lipid microspheres (DEFINITY) injection 1.5 mL, 1.5 mL, IntraVENous, ONCE PRN  atorvastatin (LIPITOR) tablet 40 mg, 40 mg, Oral, Nightly  aspirin EC tablet 81 mg, 81 mg, Oral, Daily  pantoprazole (PROTONIX) tablet 40 mg, 40 mg, Oral, QAM AC  acetaminophen (TYLENOL) tablet 650 mg, 650 mg, Oral, Q6H PRN  acetaminophen (TYLENOL) suppository 650 mg, 650 mg, Rectal, Q4H PRN  ondansetron (ZOFRAN) injection 4 mg, 4 mg, IntraVENous, Q6H PRN  promethazine (PHENERGAN) injection 25 mg, 25 mg, IntraMUSCular, Q6H PRN  liothyronine (CYTOMEL) tablet 5 mcg, 5 mcg, Oral, Daily  meclizine (ANTIVERT) tablet 25 mg, 25 mg, Oral, TID PRN  Vitamin D (CHOLECALCIFEROL) tablet 1,000 Units, 1,000 Units, Oral, Daily  losartan (COZAAR) tablet 25 mg, 25 mg, Oral, Daily  levothyroxine (SYNTHROID) tablet 50 mcg, 50 mcg, Oral, Daily      Physical  VITALS:  /60   Pulse 64   Temp 97.7 °F (36.5 °C) (Oral)   Resp 17   Ht 1.575 m (5' 2\")   Wt 88.8 kg (195 lb 12.8 oz)   SpO2 95%   BMI 35.81 kg/m²   CURRENT TEMPERATURE:  Temp: 97.7 °F (36.5 °C)  CONSTITUTIONAL: No acute distress.  EYES: Vision is intact.  ENT: No sore throat.  No ear drainage.  NECK: No JVD.  BACK: Symmetric.  LUNGS:  clear to auscultation  CARDIOVASCULAR:  normal S1 and S2, no S3, and no S4  ABDOMEN:  non-distended  NEUROLOGIC: No focal deficits.    EXTREMITIES: No edema cyanosis or clubbing.    DATA:      ECG:  I have reviewed EKG with the following interpretation:  normal sinus rhythm      Cardiology Labs:    Lab Results   Component Value Date/Time     07/15/2024 05:19 AM    K 4.3 07/15/2024 05:19 AM     07/15/2024 05:19 AM    CO2 21 07/15/2024 05:19 AM    BUN 20 07/15/2024 05:19 AM    CREATININE 0.7 07/15/2024 05:19 AM 
Date:7/15/2024  Patient Name: Candie Kramer  MRN: [id number]  : 1960  ROOM #: 0416/0416-01    Occupational Therapy order received, chart reviewed and evaluation attempted this date. Patient is unavailable for OT evaluation due to being off the unit for an ECHO.      Will attempt OT evaluation at a later time. Thank you.   Jannet Moore OTR/L #[id number]       
Internal Medicine Consult Note    [facility]    Lior Escobedo D.O., ERNSTI.                    Elias Mast D.O., WES Valencia D.O.     Alissa Fleiz, MSN, APRN, NP-C  Michele Chávez, MSN, APRN-CNP  Stas Boss, MSN, APRN-CNP  Quiana Wheeler, MSN APRN-CNP  Flor Post, MSN. APRN-NP-C     Primary Care Physician: Patricio Rutledge MD   Admitting Physician:  Lior Escobedo DO  Admission date and time: 7/11/2024 10:51 PM    Room:  [address] [place] [address]  Admitting diagnosis: Stroke-like symptoms [R29.90]  Acute CVA (cerebrovascular accident) (HCC) [I63.9]    Patient Name: Candie Kramer  MRN: [id number]    Date of Service: 7/14/2024     Subjective:  Candie is a 64 y.o. female who was seen and examined today,7/14/2024, at the bedside.  Patient interviewed in the presence of Dr. Agarwal neurology.  MRI does show small lacunar infarct in the right frontal lobe.  Patient complaining of weakness to left arm.  No other symptomatology    No family present during my examination.    Review of System:   Constitutional:   Denies fever or chills, weight loss or gain, fatigue or malaise.  HEENT:   Denies ear pain, sore throat, sinus or eye problems.  Cardiovascular:   Denies any chest pain, irregular heartbeats, or palpitations.   Respiratory:   Denies shortness of breath, coughing, sputum production, hemoptysis, or wheezing.  Gastrointestinal:   Denies nausea, vomiting, diarrhea, or constipation.  Denies any abdominal pain.  Genitourinary:    Denies any urgency, frequency, hematuria. Voiding  without difficulty.  Extremities:   Denies lower extremity swelling, edema or cyanosis.   Neurology:    Denies any headache or focal neurological deficits, Denies generalized weakness or memory difficulty.  Weakness of left arm per problem coordination  Psch:   Denies being anxious or depressed.  Musculoskeletal:    Denies  myalgias, joint complaints or back pain. 
No new complaints  Objective:      Neuro exam 132/70 p 68 t 98  General: normal orientation and alertness.  Cranial nerve testing was normal.  Funduscopic eye exam revealed not testable.  Motor exam:  full except LUE 3/5 .  Deep tendon reflexes were absent bilaterally.  Plantar responses were flexor bilaterally.  Cerebellar exam noted finger to nose without dysmetria.  Sensation was normal to joint position sense and light touch . .        Assessment:   Possible right subcortical stroke with negative CTA head and neck.  EKG NSR        Plan:   Antiplatelet/statin therapy    Echocardiogram  PT/OT/?rehab  Discussed with Dr Escobedo.  
Patient practiced and was instructed/facilitated in the following treatment: Patient assisted to edge of bed,   Sat edge of bed 5 minutes with No assist to increase dynamic sitting balance and activity tolerance. ambulated in hallway, performed stairs, ambulated back to room, assisted up to chair        Therapeutic Exercises:  not performed  x   reps.       At end of session, patient in chair with     call light and phone within reach,  all lines and tubes intact, nursing notified.      Patient would benefit from continued skilled Physical Therapy to improve functional independence and quality of life.         Patient's/ family goals   home    Time in  0847  Time out  0905    Total Treatment Time  0 minutes    Evaluation time includes thorough review of current medical information, gathering information on past medical history/social history and prior level of function, completion of standardized testing/informal observation of tasks, assessment of data, and development of Plan of care and goals.     CPT codes:  Low Complexity PT evaluation (63894)  No treatment    Erika De Leon, PT

## 2024-08-09 DIAGNOSIS — I63.9 ACUTE CVA (CEREBROVASCULAR ACCIDENT) (HCC): ICD-10-CM

## 2025-02-11 ENCOUNTER — TELEPHONE (OUTPATIENT)
Dept: ENT CLINIC | Age: 65
End: 2025-02-11

## 2025-02-11 ENCOUNTER — HOSPITAL ENCOUNTER (EMERGENCY)
Age: 65
Discharge: HOME | End: 2025-02-11
Payer: MEDICARE

## 2025-02-11 VITALS
HEART RATE: 89 BPM | DIASTOLIC BLOOD PRESSURE: 91 MMHG | TEMPERATURE: 97 F | RESPIRATION RATE: 20 BRPM | OXYGEN SATURATION: 99 % | SYSTOLIC BLOOD PRESSURE: 149 MMHG

## 2025-02-11 DIAGNOSIS — R04.0 EPISTAXIS: Primary | ICD-10-CM

## 2025-02-11 PROCEDURE — 2500000003 HC RX 250 WO HCPCS: Performed by: PHYSICIAN ASSISTANT

## 2025-02-11 PROCEDURE — 99283 EMERGENCY DEPT VISIT LOW MDM: CPT

## 2025-02-11 PROCEDURE — 30903 CONTROL OF NOSEBLEED: CPT

## 2025-02-11 RX ORDER — LIDOCAINE HYDROCHLORIDE 40 MG/ML
4 INJECTION, SOLUTION RETROBULBAR ONCE
Status: DISCONTINUED | OUTPATIENT
Start: 2025-02-11 | End: 2025-02-11

## 2025-02-11 RX ORDER — TRANEXAMIC ACID 100 MG/ML
100 INJECTION, SOLUTION INTRAVENOUS ONCE
Status: COMPLETED | OUTPATIENT
Start: 2025-02-11 | End: 2025-02-11

## 2025-02-11 RX ORDER — LIDOCAINE HYDROCHLORIDE 40 MG/ML
4 INJECTION, SOLUTION RETROBULBAR ONCE
Status: DISCONTINUED | OUTPATIENT
Start: 2025-02-11 | End: 2025-02-11 | Stop reason: HOSPADM

## 2025-02-11 RX ADMIN — TRANEXAMIC ACID 100 MG: 1 INJECTION, SOLUTION INTRAVENOUS at 10:46

## 2025-02-11 NOTE — ED PROVIDER NOTES
blood noted in the posterior pharynx.  No airway compromise.  Patient reports that she sees Dr. Faye.  She is recommended to follow-up with him within 48 hours for reevaluation and possible removal of Rhino Rocket.  She was also given ENT on-call should she have difficulty establishing with her ENT.  She has no signs of airway compromise or emergent findings that warrant further evaluation admit to the hospital at this time.  Strict return precautions were discussed.  Patient is understandable and agreeable to plan.         Discharge Instructions:   Patient referred to  Job Muir MD  [address] [facility] [id number] [phone]    Schedule an appointment as soon as possible for a visit in 2 days      Stefano Chirinos DO  [address] [facility] [address] [phone]    Schedule an appointment as soon as possible for a visit         MEDICATIONS:   DISCHARGE MEDICATIONS:  New Prescriptions    No medications on file       DISCONTINUED MEDICATIONS:  Discontinued Medications    No medications on file       Record Review:  Records Reviewed : None       Disposition Considerations:  This patient's ED course included: a personal history and physicial examination and multiple bedside re-evaluations  This patient has remained hemodynamically stable, improved, and been closely monitored during their ED course.       I emphasized the importance of follow-up with the physician I referred them to in the timeframe recommended.  I discussed with the patient emergent symptoms and the need to immediately return to the ER. Written information was included in their discharge instructions. Additional verbal discharge instructions were also given and discussed with the patient to supplement those generated by the EMR. We also discussed medications that were prescribed  (if any) including common side effects and interactions. The patient was advised to abstain from driving, operating heavy machinery or

## 2025-02-11 NOTE — DISCHARGE INSTR - COC
Continuity of Care Form    Patient Name: Candie Kramer   :  1960  MRN:  [id number]    Admit date:  2025  Discharge date:  ***    Code Status Order: Prior   Advance Directives:   Advance Care Flowsheet Documentation             Admitting Physician:  No admitting provider for patient encounter.  PCP: Patriico Rutledge MD    Discharging Nurse: ***  Discharging Hospital Unit/Room#: IWBAEX77/INT-03  Discharging Unit Phone Number: ***    Emergency Contact:   Extended Emergency Contact Information  Primary Emergency Contact: Mayela Callaway  Address: [address] [facility] [address] [facility] [address]           [address] [facility]  Home Phone: [phone]  Mobile Phone: [phone]  Relation: Brother/Sister    Past Surgical History:  Past Surgical History:   Procedure Laterality Date    ANKLE SURGERY      BACK SURGERY      CHOLECYSTECTOMY         Immunization History:   Immunization History   Administered Date(s) Administered    COVID-19, MODERNA, (age 12y+), IM, 50mcg/0.5mL 10/08/2023    COVID-19, PFIZER Bivalent, DO NOT Dilute, (age 12y+), IM, 30 mcg/0.3 mL 2023       Active Problems:  Patient Active Problem List   Diagnosis Code    SIMBA (obstructive sleep apnea) G47.33    Obesity E66.9    Stroke (cerebrum) ([facility]) I63.9    Acute CVA (cerebrovascular accident) ([facility]) I63.9       Isolation/Infection:   Isolation            No Isolation          Patient Infection Status       None to display            Nurse Assessment:  Last Vital Signs: BP (!) 149/91   Pulse 89   Temp 97 °F (36.1 °C)   Resp 20   SpO2 99%     Last documented pain score (0-10 scale):    Last Weight:   Wt Readings from Last 1 Encounters:   07/15/24 88.5 kg (195 lb)     Mental Status:  {IP PT MENTAL STATUS:}    IV Access:  { VASU IV ACCESS:786509114}    Nursing Mobility/ADLs:  Walking   {CHP DME ADLs:896022841}  Transfer  {CHP DME ADLs:347212621}  Bathing  {CHP DME ADLs:812496705}  Dressing  {CHP DME ADLs:924540085}  Toileting  {CHP DME  oral

## 2025-02-11 NOTE — TELEPHONE ENCOUNTER
Patient seen at [facility] ED today for nose bleed, has packing.  told to follow up with Dr Chirinos

## 2025-02-14 ENCOUNTER — OFFICE VISIT (OUTPATIENT)
Dept: ENT CLINIC | Age: 65
End: 2025-02-14
Payer: MEDICARE

## 2025-02-14 VITALS
RESPIRATION RATE: 18 BRPM | DIASTOLIC BLOOD PRESSURE: 69 MMHG | HEIGHT: 62 IN | WEIGHT: 190.9 LBS | OXYGEN SATURATION: 98 % | SYSTOLIC BLOOD PRESSURE: 131 MMHG | HEART RATE: 73 BPM | BODY MASS INDEX: 35.13 KG/M2

## 2025-02-14 DIAGNOSIS — Z79.01 CHRONIC ANTICOAGULATION: ICD-10-CM

## 2025-02-14 DIAGNOSIS — R04.0 EPISTAXIS: Primary | ICD-10-CM

## 2025-02-14 PROCEDURE — 3017F COLORECTAL CA SCREEN DOC REV: CPT | Performed by: OTOLARYNGOLOGY

## 2025-02-14 PROCEDURE — G8427 DOCREV CUR MEDS BY ELIG CLIN: HCPCS | Performed by: OTOLARYNGOLOGY

## 2025-02-14 PROCEDURE — 99204 OFFICE O/P NEW MOD 45 MIN: CPT | Performed by: OTOLARYNGOLOGY

## 2025-02-14 PROCEDURE — 1123F ACP DISCUSS/DSCN MKR DOCD: CPT | Performed by: OTOLARYNGOLOGY

## 2025-02-14 PROCEDURE — 30905 CONTROL OF NOSEBLEED: CPT | Performed by: OTOLARYNGOLOGY

## 2025-02-14 PROCEDURE — 1159F MED LIST DOCD IN RCRD: CPT | Performed by: OTOLARYNGOLOGY

## 2025-02-14 PROCEDURE — 1036F TOBACCO NON-USER: CPT | Performed by: OTOLARYNGOLOGY

## 2025-02-14 PROCEDURE — G8417 CALC BMI ABV UP PARAM F/U: HCPCS | Performed by: OTOLARYNGOLOGY

## 2025-02-14 PROCEDURE — G8400 PT W/DXA NO RESULTS DOC: HCPCS | Performed by: OTOLARYNGOLOGY

## 2025-02-14 PROCEDURE — 1090F PRES/ABSN URINE INCON ASSESS: CPT | Performed by: OTOLARYNGOLOGY

## 2025-02-14 RX ORDER — CARVEDILOL 6.25 MG/1
TABLET ORAL
COMMUNITY

## 2025-02-14 RX ORDER — MUPIROCIN 20 MG/G
OINTMENT TOPICAL
COMMUNITY
Start: 2024-08-21 | End: 2025-02-16

## 2025-02-14 RX ORDER — FUROSEMIDE 20 MG/1
TABLET ORAL
COMMUNITY
Start: 2025-01-26

## 2025-02-14 RX ORDER — PANTOPRAZOLE SODIUM 40 MG/1
TABLET, DELAYED RELEASE ORAL
COMMUNITY
Start: 2025-01-15

## 2025-02-14 RX ORDER — EMPAGLIFLOZIN 10 MG/1
TABLET, FILM COATED ORAL
COMMUNITY
Start: 2025-02-07

## 2025-02-14 NOTE — PROGRESS NOTES
[facility]  ISABELLA JeronimoO. Ms.Ed.  New Consult       Patient Name:  Candie Kramer  :  1960     CHIEF C/O:    Chief Complaint   Patient presents with   • Follow-up     Patient here today as new patient for ED follow up. Has nasal packing in left nare from epistaxis episode.       HISTORY OBTAINED FROM:  patient    HISTORY OF PRESENT ILLNESS:       Candie is a 65 y.o. year old female, here today for:       Patient seen and examined as a new evaluation left-sided epistaxis status post nasal packing placed in the emergency department.  Patient has not no further bleeding since that time, she does currently take Plavix and aspirin daily and has not held any of her medications.  Her blood pressure has been under relatively good control, she has some mild facial pain without any associate shortness of breath stridor difficulty swallowing nausea vomit chest pain no prior history of nasal cautery no prior history of nasal surgeries.         Past Medical History:   Diagnosis Date   • GERD (gastroesophageal reflux disease)    • Obesity    • SIMBA (obstructive sleep apnea)    • Other specified hypothyroidism    • Secondary hypertension    • Vertigo      Past Surgical History:   Procedure Laterality Date   • ANKLE SURGERY     • BACK SURGERY     • CHOLECYSTECTOMY         Current Outpatient Medications:   •  carvedilol (COREG) 6.25 MG tablet, Oral for 30 Days, Disp: , Rfl:   •  JARDIANCE 10 MG tablet, , Disp: , Rfl:   •  furosemide (LASIX) 20 MG tablet, , Disp: , Rfl:   •  mupirocin (BACTROBAN) 2 % ointment, Apply a small amount to both nostrils Twice a day for 30 days, Disp: , Rfl:   •  pantoprazole (PROTONIX) 40 MG tablet, , Disp: , Rfl:   •  aspirin 81 MG EC tablet, Take 1 tablet by mouth daily, Disp: 30 tablet, Rfl: 3  •  atorvastatin (LIPITOR) 40 MG tablet, Take 1 tablet by mouth nightly, Disp: 30 tablet, Rfl: 3  •  clopidogrel (PLAVIX) 75 MG tablet, Take 1 tablet by mouth daily, Disp: 30 tablet,

## 2025-02-21 ENCOUNTER — OFFICE VISIT (OUTPATIENT)
Dept: ENT CLINIC | Age: 65
End: 2025-02-21
Payer: MEDICARE

## 2025-02-21 VITALS
HEART RATE: 66 BPM | BODY MASS INDEX: 35.44 KG/M2 | DIASTOLIC BLOOD PRESSURE: 67 MMHG | SYSTOLIC BLOOD PRESSURE: 110 MMHG | WEIGHT: 192.6 LBS | RESPIRATION RATE: 18 BRPM | HEIGHT: 62 IN

## 2025-02-21 DIAGNOSIS — Z79.01 CHRONIC ANTICOAGULATION: ICD-10-CM

## 2025-02-21 DIAGNOSIS — R04.0 EPISTAXIS: Primary | ICD-10-CM

## 2025-02-21 PROCEDURE — 99213 OFFICE O/P EST LOW 20 MIN: CPT | Performed by: OTOLARYNGOLOGY

## 2025-02-21 PROCEDURE — 1036F TOBACCO NON-USER: CPT | Performed by: OTOLARYNGOLOGY

## 2025-02-21 PROCEDURE — 1159F MED LIST DOCD IN RCRD: CPT | Performed by: OTOLARYNGOLOGY

## 2025-02-21 PROCEDURE — G8427 DOCREV CUR MEDS BY ELIG CLIN: HCPCS | Performed by: OTOLARYNGOLOGY

## 2025-02-21 PROCEDURE — G8400 PT W/DXA NO RESULTS DOC: HCPCS | Performed by: OTOLARYNGOLOGY

## 2025-02-21 PROCEDURE — 1123F ACP DISCUSS/DSCN MKR DOCD: CPT | Performed by: OTOLARYNGOLOGY

## 2025-02-21 PROCEDURE — 1090F PRES/ABSN URINE INCON ASSESS: CPT | Performed by: OTOLARYNGOLOGY

## 2025-02-21 PROCEDURE — G8417 CALC BMI ABV UP PARAM F/U: HCPCS | Performed by: OTOLARYNGOLOGY

## 2025-02-21 PROCEDURE — 3017F COLORECTAL CA SCREEN DOC REV: CPT | Performed by: OTOLARYNGOLOGY

## 2025-02-21 RX ORDER — MUPIROCIN 20 MG/G
1 OINTMENT TOPICAL 2 TIMES DAILY
COMMUNITY
Start: 2025-02-07

## 2025-02-21 NOTE — PROGRESS NOTES
[facility]  ISABELLA JeronimoO. Ms.Ed        Patient Name:  Candie Kramer  :  1960     CHIEF C/O:    Chief Complaint   Patient presents with    Follow-up     Patient here today for follow up on epistaxis. Reports doing well, denies any nose bleeds since last visit.       HISTORY OBTAINED FROM:  patient    HISTORY OF PRESENT ILLNESS:       Candie is a 65 y.o. year old female, here today for follow up of:       Patient seen in follow-up history of severe MR history of epistaxis in the office underwent intranasal cautery followed by nasal packing has since done well without any further bleeding.  Patient did stop her anticoagulation, has stabilized.  She has no shortness of breath difficulty swallowing no nasal bleeding since last seen, no other complaints of headaches vision changes difficulty swallowing fever or chills.           Past Medical History:   Diagnosis Date    GERD (gastroesophageal reflux disease)     Obesity     SIMBA (obstructive sleep apnea)     Other specified hypothyroidism     Secondary hypertension     Vertigo      Past Surgical History:   Procedure Laterality Date    ANKLE SURGERY      BACK SURGERY      CHOLECYSTECTOMY         Current Outpatient Medications:     mupirocin (BACTROBAN) 2 % ointment, Apply 1 Application topically 2 times daily, Disp: , Rfl:     carvedilol (COREG) 6.25 MG tablet, Oral for 30 Days, Disp: , Rfl:     JARDIANCE 10 MG tablet, , Disp: , Rfl:     furosemide (LASIX) 20 MG tablet, , Disp: , Rfl:     pantoprazole (PROTONIX) 40 MG tablet, , Disp: , Rfl:     aspirin 81 MG EC tablet, Take 1 tablet by mouth daily, Disp: 30 tablet, Rfl: 3    atorvastatin (LIPITOR) 40 MG tablet, Take 1 tablet by mouth nightly, Disp: 30 tablet, Rfl: 3    clopidogrel (PLAVIX) 75 MG tablet, Take 1 tablet by mouth daily, Disp: 30 tablet, Rfl: 3    liothyronine (CYTOMEL) 5 MCG tablet, Take 1 tablet by mouth daily, Disp: , Rfl:     losartan (COZAAR) 25 MG tablet, Take 1 tablet by mouth

## 2025-03-03 ASSESSMENT — ENCOUNTER SYMPTOMS
SHORTNESS OF BREATH: 0
COUGH: 0
VOMITING: 0

## 2025-04-04 ENCOUNTER — OFFICE VISIT (OUTPATIENT)
Dept: ENT CLINIC | Age: 65
End: 2025-04-04
Payer: MEDICARE

## 2025-04-04 VITALS
OXYGEN SATURATION: 96 % | DIASTOLIC BLOOD PRESSURE: 76 MMHG | HEIGHT: 62 IN | SYSTOLIC BLOOD PRESSURE: 140 MMHG | RESPIRATION RATE: 18 BRPM | HEART RATE: 70 BPM | WEIGHT: 190.8 LBS | BODY MASS INDEX: 35.11 KG/M2

## 2025-04-04 DIAGNOSIS — Z79.01 CHRONIC ANTICOAGULATION: ICD-10-CM

## 2025-04-04 DIAGNOSIS — R04.0 EPISTAXIS: Primary | ICD-10-CM

## 2025-04-04 PROCEDURE — 1123F ACP DISCUSS/DSCN MKR DOCD: CPT | Performed by: OTOLARYNGOLOGY

## 2025-04-04 PROCEDURE — 1159F MED LIST DOCD IN RCRD: CPT | Performed by: OTOLARYNGOLOGY

## 2025-04-04 PROCEDURE — 3017F COLORECTAL CA SCREEN DOC REV: CPT | Performed by: OTOLARYNGOLOGY

## 2025-04-04 PROCEDURE — 99213 OFFICE O/P EST LOW 20 MIN: CPT | Performed by: OTOLARYNGOLOGY

## 2025-04-04 PROCEDURE — G8417 CALC BMI ABV UP PARAM F/U: HCPCS | Performed by: OTOLARYNGOLOGY

## 2025-04-04 PROCEDURE — 1090F PRES/ABSN URINE INCON ASSESS: CPT | Performed by: OTOLARYNGOLOGY

## 2025-04-04 PROCEDURE — 1036F TOBACCO NON-USER: CPT | Performed by: OTOLARYNGOLOGY

## 2025-04-04 PROCEDURE — G8427 DOCREV CUR MEDS BY ELIG CLIN: HCPCS | Performed by: OTOLARYNGOLOGY

## 2025-04-04 PROCEDURE — G8400 PT W/DXA NO RESULTS DOC: HCPCS | Performed by: OTOLARYNGOLOGY

## 2025-04-07 ASSESSMENT — ENCOUNTER SYMPTOMS
COUGH: 0
SHORTNESS OF BREATH: 0
VOMITING: 0

## 2025-04-07 NOTE — PROGRESS NOTES
[facility]  ISABELLA JeronimoO. Ms.Ed        Patient Name:  Candie Kramer  :  1960     CHIEF C/O:    Chief Complaint   Patient presents with    Follow-up     Patient here today to follow up on epistaxis events. Reports she has had nose bleeds everyday this week. Events lasting only \"a couple minutes\".        HISTORY OBTAINED FROM:  patient    HISTORY OF PRESENT ILLNESS:       Candie is a 65 y.o. year old female, here today for follow up of:         History of Present Illness  The patient presents for evaluation of nosebleeds.    She continues to experience epistaxis, although the frequency has decreased. This week, she experienced daily episodes of bilateral nasal bleeding, with the most recent episode occurring this morning. The duration of each episode is typically a few minutes, characterized by actual bleeding rather than blood-tinged mucus. These episodes are often triggered by nasal exhalation. She has been managing the condition with saline solution and Bactroban ointment, but these interventions have not provided significant relief.    MEDICATIONS  Current: Bactroban         Past Medical History:   Diagnosis Date    GERD (gastroesophageal reflux disease)     Obesity     SIMBA (obstructive sleep apnea)     Other specified hypothyroidism     Secondary hypertension     Vertigo      Past Surgical History:   Procedure Laterality Date    ANKLE SURGERY      BACK SURGERY      CHOLECYSTECTOMY         Current Outpatient Medications:     mupirocin (BACTROBAN) 2 % ointment, Apply 1 Application topically 2 times daily, Disp: , Rfl:     carvedilol (COREG) 6.25 MG tablet, Oral for 30 Days, Disp: , Rfl:     JARDIANCE 10 MG tablet, , Disp: , Rfl:     furosemide (LASIX) 20 MG tablet, , Disp: , Rfl:     pantoprazole (PROTONIX) 40 MG tablet, , Disp: , Rfl:     aspirin 81 MG EC tablet, Take 1 tablet by mouth daily, Disp: 30 tablet, Rfl: 3    atorvastatin (LIPITOR) 40 MG tablet, Take 1 tablet by mouth nightly,

## 2025-04-30 ENCOUNTER — HOSPITAL ENCOUNTER (OUTPATIENT)
Dept: GENERAL RADIOLOGY | Age: 65
Discharge: HOME | End: 2025-05-02
Payer: MEDICARE

## 2025-04-30 ENCOUNTER — HOSPITAL ENCOUNTER (OUTPATIENT)
Age: 65
Discharge: HOME | End: 2025-04-30
Payer: MEDICARE

## 2025-04-30 ENCOUNTER — HOSPITAL ENCOUNTER (OUTPATIENT)
Age: 65
Discharge: HOME | End: 2025-05-02
Payer: MEDICARE

## 2025-04-30 DIAGNOSIS — R05.9 COMPLAINING OF COUGH: ICD-10-CM

## 2025-04-30 LAB
ALBUMIN SERPL-MCNC: 3.8 G/DL (ref 3.5–5.2)
ALP SERPL-CCNC: 114 U/L (ref 35–104)
ALT SERPL-CCNC: 15 U/L (ref 0–32)
ANION GAP SERPL CALCULATED.3IONS-SCNC: 9 MMOL/L (ref 7–16)
AST SERPL-CCNC: 20 U/L (ref 0–31)
BASOPHILS # BLD: 0.09 K/UL (ref 0–0.2)
BASOPHILS NFR BLD: 1 % (ref 0–2)
BILIRUB SERPL-MCNC: 0.9 MG/DL (ref 0–1.2)
BUN SERPL-MCNC: 9 MG/DL (ref 6–23)
CALCIUM SERPL-MCNC: 9.5 MG/DL (ref 8.6–10.2)
CHLORIDE SERPL-SCNC: 105 MMOL/L (ref 98–107)
CO2 SERPL-SCNC: 24 MMOL/L (ref 22–29)
CREAT SERPL-MCNC: 0.9 MG/DL (ref 0.5–1)
EOSINOPHIL # BLD: 0.15 K/UL (ref 0.05–0.5)
EOSINOPHILS RELATIVE PERCENT: 1 % (ref 0–6)
ERYTHROCYTE [DISTWIDTH] IN BLOOD BY AUTOMATED COUNT: 14.7 % (ref 11.5–15)
GFR, ESTIMATED: 70 ML/MIN/1.73M2
GLUCOSE SERPL-MCNC: 104 MG/DL (ref 74–99)
HCT VFR BLD AUTO: 41.8 % (ref 34–48)
HGB BLD-MCNC: 13.1 G/DL (ref 11.5–15.5)
IMM GRANULOCYTES # BLD AUTO: 0.07 K/UL (ref 0–0.58)
IMM GRANULOCYTES NFR BLD: 1 % (ref 0–5)
LYMPHOCYTES NFR BLD: 1.86 K/UL (ref 1.5–4)
LYMPHOCYTES RELATIVE PERCENT: 13 % (ref 20–42)
MCH RBC QN AUTO: 28.3 PG (ref 26–35)
MCHC RBC AUTO-ENTMCNC: 31.3 G/DL (ref 32–34.5)
MCV RBC AUTO: 90.3 FL (ref 80–99.9)
MONOCYTES NFR BLD: 0.92 K/UL (ref 0.1–0.95)
MONOCYTES NFR BLD: 7 % (ref 2–12)
NEUTROPHILS NFR BLD: 78 % (ref 43–80)
NEUTS SEG NFR BLD: 11.01 K/UL (ref 1.8–7.3)
PLATELET # BLD AUTO: 438 K/UL (ref 130–450)
PMV BLD AUTO: 9.8 FL (ref 7–12)
POTASSIUM SERPL-SCNC: 4.6 MMOL/L (ref 3.5–5)
PROT SERPL-MCNC: 7.4 G/DL (ref 6.4–8.3)
RBC # BLD AUTO: 4.63 M/UL (ref 3.5–5.5)
SODIUM SERPL-SCNC: 138 MMOL/L (ref 132–146)
WBC OTHER # BLD: 14.1 K/UL (ref 4.5–11.5)

## 2025-04-30 PROCEDURE — 80053 COMPREHEN METABOLIC PANEL: CPT

## 2025-04-30 PROCEDURE — 71046 X-RAY EXAM CHEST 2 VIEWS: CPT

## 2025-04-30 PROCEDURE — 36415 COLL VENOUS BLD VENIPUNCTURE: CPT

## 2025-04-30 PROCEDURE — 85025 COMPLETE CBC W/AUTO DIFF WBC: CPT

## 2025-06-13 ENCOUNTER — TRANSCRIBE ORDERS (OUTPATIENT)
Dept: ADMINISTRATIVE | Age: 65
End: 2025-06-13

## 2025-06-13 DIAGNOSIS — R05.3 CHRONIC COUGH: Primary | ICD-10-CM

## 2025-06-13 DIAGNOSIS — Z57.9 OCCUPATIONAL EXPOSURE IN WORKPLACE: ICD-10-CM

## 2025-06-13 DIAGNOSIS — G47.33 OSA (OBSTRUCTIVE SLEEP APNEA): Primary | ICD-10-CM

## 2025-06-13 SDOH — HEALTH STABILITY - PHYSICAL HEALTH: OCCUPATIONAL EXPOSURE TO UNSPECIFIED RISK FACTOR: Z57.9

## 2025-06-27 ENCOUNTER — HOSPITAL ENCOUNTER (OUTPATIENT)
Dept: SLEEP CENTER | Age: 65
Discharge: HOME | End: 2025-06-27

## 2025-06-27 DIAGNOSIS — G47.33 OSA (OBSTRUCTIVE SLEEP APNEA): ICD-10-CM

## 2025-06-27 NOTE — PROGRESS NOTES
Pt could not proceed with HST via WatchPat - it is contraindicated with a bluetooth cardiac monitor.  Will proceed to obtain order for in-lab sleep study.

## 2025-06-30 DIAGNOSIS — G47.33 OSA (OBSTRUCTIVE SLEEP APNEA): Primary | ICD-10-CM

## 2025-07-16 ENCOUNTER — HOSPITAL ENCOUNTER (OUTPATIENT)
Dept: PULMONOLOGY | Age: 65
Discharge: HOME | End: 2025-07-16
Payer: MEDICARE

## 2025-07-16 ENCOUNTER — HOSPITAL ENCOUNTER (OUTPATIENT)
Dept: CT IMAGING | Age: 65
Discharge: HOME | End: 2025-07-18
Payer: MEDICARE

## 2025-07-16 DIAGNOSIS — R05.3 CHRONIC COUGH: ICD-10-CM

## 2025-07-16 DIAGNOSIS — Z57.9 OCCUPATIONAL EXPOSURE IN WORKPLACE: ICD-10-CM

## 2025-07-16 PROCEDURE — 94726 PLETHYSMOGRAPHY LUNG VOLUMES: CPT

## 2025-07-16 PROCEDURE — 71250 CT THORAX DX C-: CPT

## 2025-07-16 PROCEDURE — 94060 EVALUATION OF WHEEZING: CPT

## 2025-07-16 PROCEDURE — 94729 DIFFUSING CAPACITY: CPT

## 2025-07-16 SDOH — HEALTH STABILITY - PHYSICAL HEALTH: OCCUPATIONAL EXPOSURE TO UNSPECIFIED RISK FACTOR: Z57.9

## 2025-07-16 NOTE — PROCEDURES
[facility]              [address] [place] [address]                           PULMONARY FUNCTION      PATIENT NAME: MARLENE BOGGS                 : 1960  MED REC NO: [id number]                        ROOM:   ACCOUNT NO: [id number]                       ADMIT DATE: 2025  PROVIDER: Jasiel Colón MD      DATE OF PROCEDURE: 2025    SURGEON:  Jasiel Colón MD    REFERRING PHYSICIAN:  LEONOR PADRON    FINDINGS:  This is a full PFT of good quality.  Spirometry revealed normal FVC and FEV1 with normal FEV1/FVC ratio.  There is no bronchodilator induced improvement.  The best FEV1 was the post-bronchodilator value of 1.75 L (80% of predicted).    Lung volumes reveal normal total lung capacity, residual volume, and thoracic gas volume.    Diffusion capacity was mildly reduced, but normalized after correction for alveolar volume.    Airway resistance is normal.    IMPRESSION:  Essentially normal PFT with normal corrected diffusion capacity and no bronchodilator response.          JASIEL COLÓN MD      D:  2025 09:15:56     T:  2025 09:25:00     LEIGH/LINDSEY  Job #:  [id number]     Doc#:  [id number]

## 2025-07-24 ENCOUNTER — HOSPITAL ENCOUNTER (OUTPATIENT)
Dept: SLEEP CENTER | Age: 65
Discharge: HOME | End: 2025-07-24
Payer: MEDICARE

## 2025-07-24 DIAGNOSIS — G47.33 OSA (OBSTRUCTIVE SLEEP APNEA): ICD-10-CM

## 2025-07-24 PROCEDURE — 95810 POLYSOM 6/> YRS 4/> PARAM: CPT

## 2025-07-25 ENCOUNTER — OFFICE VISIT (OUTPATIENT)
Dept: ENT CLINIC | Age: 65
End: 2025-07-25
Payer: MEDICARE

## 2025-07-25 VITALS
HEIGHT: 62 IN | WEIGHT: 188 LBS | HEART RATE: 54 BPM | DIASTOLIC BLOOD PRESSURE: 76 MMHG | OXYGEN SATURATION: 97 % | SYSTOLIC BLOOD PRESSURE: 121 MMHG | BODY MASS INDEX: 34.6 KG/M2 | RESPIRATION RATE: 16 BRPM

## 2025-07-25 DIAGNOSIS — R04.0 EPISTAXIS: Primary | ICD-10-CM

## 2025-07-25 DIAGNOSIS — Z79.01 CHRONIC ANTICOAGULATION: ICD-10-CM

## 2025-07-25 PROCEDURE — 1090F PRES/ABSN URINE INCON ASSESS: CPT | Performed by: OTOLARYNGOLOGY

## 2025-07-25 PROCEDURE — 1159F MED LIST DOCD IN RCRD: CPT | Performed by: OTOLARYNGOLOGY

## 2025-07-25 PROCEDURE — 1123F ACP DISCUSS/DSCN MKR DOCD: CPT | Performed by: OTOLARYNGOLOGY

## 2025-07-25 PROCEDURE — G8400 PT W/DXA NO RESULTS DOC: HCPCS | Performed by: OTOLARYNGOLOGY

## 2025-07-25 PROCEDURE — G8427 DOCREV CUR MEDS BY ELIG CLIN: HCPCS | Performed by: OTOLARYNGOLOGY

## 2025-07-25 PROCEDURE — 1036F TOBACCO NON-USER: CPT | Performed by: OTOLARYNGOLOGY

## 2025-07-25 PROCEDURE — 99213 OFFICE O/P EST LOW 20 MIN: CPT | Performed by: OTOLARYNGOLOGY

## 2025-07-25 PROCEDURE — G8417 CALC BMI ABV UP PARAM F/U: HCPCS | Performed by: OTOLARYNGOLOGY

## 2025-07-25 PROCEDURE — 3017F COLORECTAL CA SCREEN DOC REV: CPT | Performed by: OTOLARYNGOLOGY

## 2025-07-25 NOTE — PROGRESS NOTES
[facility]  ISABELLA JeronimoO. Ms.Ed        Patient Name:  Candie Kramer  :  1960     CHIEF C/O:    Chief Complaint   Patient presents with    Follow-up     3 month follow up epistaxis         HISTORY OBTAINED FROM:  patient    HISTORY OF PRESENT ILLNESS:       Candie is a 65 y.o. year old female, here today for follow up of:         History of Present Illness  The patient presents for evaluation of nosebleeds.    Doing well, has only experienced 3 or 4 light epistaxis over the past 3 months. Still on anticoagulation with hx of TIA.     MEDICATIONS  Current: Bactroban         Past Medical History:   Diagnosis Date    GERD (gastroesophageal reflux disease)     Obesity     SIMBA (obstructive sleep apnea)     Other specified hypothyroidism     Secondary hypertension     Vertigo      Past Surgical History:   Procedure Laterality Date    ANKLE SURGERY      BACK SURGERY      CHOLECYSTECTOMY         Current Outpatient Medications:     mupirocin (BACTROBAN) 2 % ointment, Apply 1 Application topically 2 times daily, Disp: , Rfl:     carvedilol (COREG) 6.25 MG tablet, Oral for 30 Days, Disp: , Rfl:     JARDIANCE 10 MG tablet, , Disp: , Rfl:     furosemide (LASIX) 20 MG tablet, , Disp: , Rfl:     pantoprazole (PROTONIX) 40 MG tablet, , Disp: , Rfl:     aspirin 81 MG EC tablet, Take 1 tablet by mouth daily, Disp: 30 tablet, Rfl: 3    atorvastatin (LIPITOR) 40 MG tablet, Take 1 tablet by mouth nightly, Disp: 30 tablet, Rfl: 3    clopidogrel (PLAVIX) 75 MG tablet, Take 1 tablet by mouth daily, Disp: 30 tablet, Rfl: 3    liothyronine (CYTOMEL) 5 MCG tablet, Take 1 tablet by mouth daily, Disp: , Rfl:     losartan (COZAAR) 25 MG tablet, Take 1 tablet by mouth daily, Disp: , Rfl:     vitamin D 25 MCG (1000 UT) CAPS, Take 1 capsule by mouth daily, Disp: , Rfl:     meclizine (ANTIVERT) 25 MG tablet, Take 1 tablet by mouth 3 times daily as needed for Dizziness, Disp: , Rfl:     levothyroxine (SYNTHROID) 50 MCG

## 2025-08-15 ENCOUNTER — TRANSCRIBE ORDERS (OUTPATIENT)
Dept: ADMINISTRATIVE | Age: 65
End: 2025-08-15

## 2025-08-15 DIAGNOSIS — R05.3 CHRONIC COUGH: Primary | ICD-10-CM
